# Patient Record
Sex: MALE | Race: WHITE | Employment: FULL TIME | URBAN - METROPOLITAN AREA
[De-identification: names, ages, dates, MRNs, and addresses within clinical notes are randomized per-mention and may not be internally consistent; named-entity substitution may affect disease eponyms.]

---

## 2021-04-15 ENCOUNTER — OFFICE VISIT (OUTPATIENT)
Dept: ORTHOPEDIC SURGERY | Age: 51
End: 2021-04-15
Payer: COMMERCIAL

## 2021-04-15 VITALS — WEIGHT: 230 LBS | HEIGHT: 73 IN | BODY MASS INDEX: 30.48 KG/M2

## 2021-04-15 DIAGNOSIS — M51.36 DDD (DEGENERATIVE DISC DISEASE), LUMBAR: ICD-10-CM

## 2021-04-15 DIAGNOSIS — R20.2 PARESTHESIA OF RIGHT LOWER EXTREMITY: ICD-10-CM

## 2021-04-15 DIAGNOSIS — M51.27 HERNIATION OF INTERVERTEBRAL DISC BETWEEN L5 AND S1: Primary | ICD-10-CM

## 2021-04-15 PROBLEM — M54.16 RIGHT LUMBAR RADICULITIS: Status: ACTIVE | Noted: 2021-04-15

## 2021-04-15 PROCEDURE — 96372 THER/PROPH/DIAG INJ SC/IM: CPT | Performed by: INTERNAL MEDICINE

## 2021-04-15 PROCEDURE — 99204 OFFICE O/P NEW MOD 45 MIN: CPT | Performed by: INTERNAL MEDICINE

## 2021-04-15 RX ORDER — CELECOXIB 200 MG/1
200 CAPSULE ORAL DAILY
Qty: 60 CAPSULE | Refills: 3 | Status: SHIPPED | OUTPATIENT
Start: 2021-04-15 | End: 2021-09-02 | Stop reason: SDUPTHER

## 2021-04-15 NOTE — PROGRESS NOTES
4/15/2021, 3:50 pm    Ketorolac Tromethamine Injection,   60 mg/2mL    NDC#: 63486-375-61    Lot#: [id number]    R dorsogluteal region

## 2021-04-15 NOTE — PROGRESS NOTES
Chief Complaint:   Chief Complaint   Patient presents with    Lower Back Pain     h/o back pain past 30 yrs, went to reach for and pour gas into mower and back pain, unable to move off couch, ED visit, then progressed to R hip/groin/thigh          History of Present Illness:       Patient is a 53 y.o. male presents with the above complaint. The symptoms began 10 daysago and started without an injury but was recently aggravated by forward bending tending to his lawnmower. His pain intensified thereafter prompting evaluation in the emergency room setting MRI was performed and findings demonstrated leftward pathology that was inconsistent with his right-sided limb symptoms. Treatment thus far has included pain management specialty consultation and trigger point injections along with chiropractic care and medical pain management inclusive of steroids both oral and IM and narcotic analgesics and escalating doses of Neurontin. Despite this his symptoms remain problematic and current pain levels 6/10 severity    He is on FMLA related to the current medical condition    The patient describes a strong neuritic quality of pain pain that does radiate involves the right lower limb in a nondermatomal distribution. The symptoms are constant  and are show no change since the onset. The pain is also prominent in the inner thigh and groin region. Past history significant intermittent low back pain and questionable history of prior lumbar spine intervention injections. The symptoms of back pain do notshow a typical discogenic provacative pattern and are constant   and improved with Changing position, chiropractic treatment and medication. . There is not new onset weakness or progressive weakness of the lower extremities that has developed. The patient denies new onset bowel or bladder dysfunction. There  is no history of previous spinal trauma.     Pain localizes to the lumbar region-axial lumbosacral    Painl Not on file   Social History Narrative    Not on file        Review of Symptoms:    Pertinent items are noted in HPI    Review of systems reviewed from Patient History Form dated on today's date and   available in the patient's chart under the Media tab. Vital Signs: There were no vitals filed for this visit. General Exam:     Constitutional: Patient is adequately groomed with no evidence of malnutrition  Mental Status: The patient is oriented to time, place and person. The patient's mood and affect are appropriate. Vascular: Examination reveals no swelling or calf tenderness. Peripheral pulses are palpable and 2+. Lymphatics: no lymphadenopathy of the inguinal region or lower extremity      Physical Exam: lower back      Primary Exam:    Inspection: No deformity atrophy appreciable curvature      Palpation: No focal trigger point tenderness      Range of Motion: 60/0      Strength: Normal lower extremity      Special Tests: Negative SLR      Skin: There are no rashes, ulcerations or lesions. Gait: Mild antalgic      Reflex intact lower     Additional Comments:        Additional Examinations:           Right Lower Extremity: Examination of the right lower extremity does not show any tenderness, deformity or injury. Range of motion is unremarkable. There is no gross instability. There are no rashes, ulcerations or lesions. Strength and tone are normal.  Left Lower Extremity: Examination of the left lower extremity does not show any tenderness, deformity or injury. Range of motion is unremarkable. There is no gross instability. There are no rashes, ulcerations or lesions. Strength and tone are normal.  Neurolgic - Hyperesthesia to light touch over the anterior aspect of the thigh, light touch is intact L2-S1 and manual muscle testing normal L2-S1. No fasiculations. Pattella tendon and Achilles tendon reflexes +2 bilaterally.   Seated SLR negative        Office Imaging Results/Procedures PerformedToday:            Office Procedures:     Orders Placed This Encounter   Procedures    MRI THORACIC SPINE WO CONTRAST     [facility], OPEN MRI, please call pt to schedule, [phone]  P[facility] will obtain auth and forward to your facility  Pt will need to f/u in clinic 2-3 days after MRI for results     Standing Status:   Future     Standing Expiration Date:   4/15/2022     Scheduling Instructions:      OPEN MRI     Order Specific Question:   Reason for exam:     Answer:   h/o R leg pain, r/o spinal cord lesion    [place] KETOROLAC TROMETHAMINE INJ       Intramuscular injection Toradol 2 cc right buttock alcohol prep 25-gauge needle        Other Outside Imaging and Testing Personally Reviewed:    MRI LUMBAR SPINE WO CON4/6/2021  [facility]   Result Impression       At L5-S1 there is a left paracentral protrusion deforming the dural sac and left lateral recess.  This measures approximately 4 mm. At L3-4, there is a left foraminal and extraforaminal protrusion that narrows the left neural foramen. Result Narrative   HISTORY:  Back pain, cauda equina syndrome suspected  Acute lower back pain.    COMPARISON: None  TECHNIQUE:   Multiplanar multisequence MRI images of the lumbar spine  NOTE:  If there are questions about the content of this report, please contact [address] [place] [address] radiology by calling [phone]    FINDINGS:  ALIGNMENT: No abnormal curvature  BONE MARROW: Unremarkable.  No aggressive osseous lesion or fracture  CONUS:  Unremarkable    DISC LEVELS:  T12-L1:  Unremarkable     L1-2:      Unremarkable    L2-3:      Unremarkable      L3-4:      There is a left foraminal and extraforaminal protrusion with annular fissure that narrows the left neural foramen around the left L3 root.    L4-5:      Minimal disc bulge and facet arthropathy.     L5-S1:    There is a left paracentral protrusion with some hyperintensity on the long TR sequences indenting the dural sac and displacing the left S1 root sleeve posteriorly in the lateral recess.  This is approximately 4 mm in AP dimensions.        LUMBOSACRAL JUNCTION: Unremarkable   SACRUM AND SI JOINTS:  Unremarkable  OTHER:  None   Other Result Information   Interface, Rad Results In - 04/06/2021 12:10 PM EDT  Formatting of this note might be different from the original.  HISTORY:  Back pain, cauda equina syndrome suspected  Acute lower back pain. COMPARISON: None  TECHNIQUE:   Multiplanar multisequence MRI images of the lumbar spine  NOTE:  If there are questions about the content of this report, please contact [facility] radiology by calling [phone]    FINDINGS:  ALIGNMENT: No abnormal curvature  BONE MARROW: Unremarkable. No aggressive osseous lesion or fracture  CONUS:  Unremarkable    DISC LEVELS:  T12-L1:  Unremarkable     L1-2:      Unremarkable    L2-3:      Unremarkable      L3-4:      There is a left foraminal and extraforaminal protrusion with annular fissure that narrows the left neural foramen around the left L3 root. L4-5:      Minimal disc bulge and facet arthropathy. L5-S1:    There is a left paracentral protrusion with some hyperintensity on the long TR sequences indenting the dural sac and displacing the left S1 root sleeve posteriorly in the lateral recess. This is approximately 4 mm in AP dimensions. LUMBOSACRAL JUNCTION: Unremarkable   SACRUM AND SI JOINTS:  Unremarkable  OTHER:  None    IMPRESSION      At L5-S1 there is a left paracentral protrusion deforming the dural sac and left lateral recess. This measures approximately 4 mm. At L3-4, there is a left foraminal and extraforaminal protrusion that narrows the left neural foramen. Status               Assessment   Impression: . Encounter Diagnoses   Name Primary?     Herniation of intervertebral disc between L5 and S1 Yes    DDD (degenerative disc disease), lumbar     Paresthesia of right lower extremity         Rule out thoracic spinal cord lesion, rule out lumbar plexopathy      Plan: Active modification lumbar disc protocol  Confirm and review MRI findings are leftward given the patient's presentation of rightward symptomatology  Consider MRI evaluation thoracic spine if findings are in fact accurate with respect to the left sided findings of disc pathology  Monitor his response to Toradol IM and start Celebrex 200 mg daily tomorrow with GI precaution continue PPI  Hold lumbar epidural invention until results of MRI T-spine are reviewed  Continue multimodal pain management inclusive of Neurontin and narcotic analgesia minimize use of narcotics  Consider EMG evaluation evaluate for lumbar plexopathy    The nature of the finding, probable diagnosis and likely treatment was thoroughly discussed with the patient and spouse. The options, risks, complications, alternative treatment as well as some of the differential diagnosis was discussed. The patient was thoroughly informed and all questions were answered. the patient indicated understanding and satisfaction with the discussion. Orders:        Orders Placed This Encounter   Procedures    MRI THORACIC SPINE WO CONTRAST     [facility], OPEN MRI, please call pt to schedule, [phone]  PON[facility] will obtain auth and forward to your facility  Pt will need to f/u in clinic 2-3 days after MRI for results     Standing Status:   Future     Standing Expiration Date:   4/15/2022     Scheduling Instructions:      OPEN MRI     Order Specific Question:   Reason for exam:     Answer:   h/o R leg pain, r/o spinal cord lesion    [place] KETOROLAC TROMETHAMINE INJ           Disclaimer: \"This note was dictated with voice recognition software. Though review and correction are routine, we apologize for any errors. \"

## 2021-04-16 ENCOUNTER — TELEPHONE (OUTPATIENT)
Dept: ORTHOPEDIC SURGERY | Age: 51
End: 2021-04-16

## 2021-04-22 ENCOUNTER — TELEPHONE (OUTPATIENT)
Dept: ORTHOPEDIC SURGERY | Age: 51
End: 2021-04-22

## 2021-04-22 DIAGNOSIS — M51.27 HERNIATION OF INTERVERTEBRAL DISC BETWEEN L5 AND S1: ICD-10-CM

## 2021-04-22 DIAGNOSIS — R20.2 PARESTHESIA OF RIGHT LOWER EXTREMITY: Primary | ICD-10-CM

## 2021-04-22 DIAGNOSIS — M51.36 DDD (DEGENERATIVE DISC DISEASE), LUMBAR: ICD-10-CM

## 2021-04-22 NOTE — TELEPHONE ENCOUNTER
Spoke to patient. He went to have MRI, did not know what the next step was, was not told to schedule F/U for results. He is upset, in a lot of pain. Wants to get epidural ASAP. Would like for you to call him. He is scheduled to come in on Monday but does not want to prolong the epidural getting done. [phone] Please advise.

## 2021-04-22 NOTE — TELEPHONE ENCOUNTER
General Question     Subject: PATIENT IN A LOT OF PAIN  Patient and /or Facility Request: Yusra Race  Contact Number: [phone]    WIFE CALLED AND STATED THAT NO ONE HAS RETURNED HER PHONE CALLS. HER  IS ON DAY 20 WITH THIS BACK PAIN.  HE WOULD LIKE A CALL BACK ASAP

## 2021-04-23 NOTE — TELEPHONE ENCOUNTER
Spoke to patient re: MRI  Needs to have EMG asap as ordered   Please correspond with him if he's needs to call or is waiting for a call to complete the EMG  thanks

## 2021-04-26 ENCOUNTER — OFFICE VISIT (OUTPATIENT)
Dept: ORTHOPEDIC SURGERY | Age: 51
End: 2021-04-26
Payer: COMMERCIAL

## 2021-04-26 VITALS — HEIGHT: 73 IN | BODY MASS INDEX: 30.47 KG/M2 | WEIGHT: 229.94 LBS

## 2021-04-26 DIAGNOSIS — R20.2 PARESTHESIA OF RIGHT LOWER EXTREMITY: ICD-10-CM

## 2021-04-26 DIAGNOSIS — M53.3 SACROILIAC JOINT DYSFUNCTION: Primary | ICD-10-CM

## 2021-04-26 DIAGNOSIS — M53.3 PAIN OF RIGHT SACROILIAC JOINT: ICD-10-CM

## 2021-04-26 PROCEDURE — 99214 OFFICE O/P EST MOD 30 MIN: CPT | Performed by: INTERNAL MEDICINE

## 2021-04-26 RX ORDER — BUPIVACAINE HYDROCHLORIDE 2.5 MG/ML
12 INJECTION, SOLUTION INFILTRATION; PERINEURAL ONCE
Status: COMPLETED | OUTPATIENT
Start: 2021-04-26 | End: 2021-04-26

## 2021-04-26 RX ORDER — LIDOCAINE HYDROCHLORIDE 10 MG/ML
5 INJECTION, SOLUTION INFILTRATION; PERINEURAL ONCE
Status: COMPLETED | OUTPATIENT
Start: 2021-04-26 | End: 2021-04-26

## 2021-04-26 RX ORDER — GABAPENTIN 300 MG/1
300 CAPSULE ORAL 2 TIMES DAILY
Qty: 60 CAPSULE | Refills: 1 | Status: SHIPPED | OUTPATIENT
Start: 2021-04-26 | End: 2021-05-05

## 2021-04-26 RX ADMIN — LIDOCAINE HYDROCHLORIDE 5 ML: 10 INJECTION, SOLUTION INFILTRATION; PERINEURAL at 11:47

## 2021-04-26 RX ADMIN — BUPIVACAINE HYDROCHLORIDE 30 MG: 2.5 INJECTION, SOLUTION INFILTRATION; PERINEURAL at 11:46

## 2021-04-26 NOTE — PROGRESS NOTES
Chief Complaint:   Chief Complaint   Patient presents with    Lower Back Pain     right sided, LBP and leg pain/N/T down to knee, \"piercing pain\" in groin, TR MRI          History of Present Illness:       Patient is a 53 y.o. male returns follow up for the above complaint. The patient was last seen approximately 11 daysago. The symptoms show no change since the last visit. The patient has had further testing for the problem. In the interim MRI scan thoracic spine was completed which is outlined below in detail    Overall show no appreciable change. 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 He continues to experience pain lumbosacral region on the right stabbing in quality along with neuritic character of pain involving the right lower limb primarily anterior medial but also involving the posterior aspect of the thigh and medial foreleg and does not follow a typical dermatomal pattern    Pain levels 4/10 severity he continues on multimodal pain management Celebrex, Neurontin and intermittent use of Percocet and muscle relaxants despite the symptoms remain problematic he remains off work      He denies any new onset progressive weakness of the the lower extremities he denies any new onset bowel or bladder dysfunction    He would like to consider other treatment options    EMG of the right lower extremity is scheduled for early May with neurology   Past Medical History:      No past medical history on file. Present Medications:         Current Outpatient Medications   Medication Sig Dispense Refill    celecoxib (CELEBREX) 200 MG capsule Take 1 capsule by mouth daily 60 capsule 3     No current facility-administered medications for this visit. Allergies:      No Known Allergies        Review of Systems:    Pertinent items are noted in HPI        Vital Signs:     There were no vitals filed for this visit. General Exam:     Constitutional: Patient is adequately groomed with no evidence of malnutrition    Physical Exam: Lumbar spine/pelvis      Primary Exam:    Inspection: No deformity atrophy appreciable curvature      Palpation: There is asymmetric tenderness over the right SI joint      Range of Motion: 70/10 pain with extension      Strength: Normal lower extremity      Special Tests: Supine sit test positive-right      Skin: There are no rashes, ulcerations or lesions.       Gait: Nonantalgic      Neurovascular - non focal and intact       Additional Comments:        Additional Examinations:                   Office Imaging Results/Procedures PerformedToday:             Office Procedures:    Ultrasound-guided RT sacroiliac joint injection-Marcaine block  [email or web address] E ultrasound 10 MHz     The patient was positioned prone on examination table with the abdomen supported with a pillow.  Diagnostic ultrasound was performed to identify the inferior aspect of the sacroiliac joint.  The l curvilinear probe was utilized.     After identifying the proper anatomic location a sterile prep was performed.  Using longitudinal technique and medial to lateral approach 25-gauge needle was advanced subcutaneously and intramuscularly under direct guidance and approximately 5 cc of 1% lidocaine was injected.  Needle was withdrawn a 22-gauge spinal needle was then advanced in the same needle tract and the needle  was advanced into the sacroiliac joint adjusting the position of the needle tip as needed to ensure intra-articular entry.  Loss of resistance was appreciated and the injection was completed with 3cc of 0.25% Marcaine.  Patient tolerated this with minimal discomfort     Band-Aid to seal the puncture wound     Partial positive anesthetic response postinjection     Technically successful injection       Orders Placed This Encounter   Procedures    Ambulatory referral to Physical Therapy     Referral Diagnoses   Name Primary?  Sacroiliac joint dysfunction Yes    Pain of right sacroiliac joint     Paresthesia of right lower extremity               Plan: Active modification postinjection protocol  Continue medical pain management as per previous wean off gabapentin  Consider advanced imaging of the sacroiliac joint. -CT pelvis rule out high-grade degenerative change/erosive changes  Pelvic stabilization program-PT supervision/chiropractic care supervision  Repeat injection with steroid. Orders:        Orders Placed This Encounter   Procedures    Ambulatory referral to Physical Therapy     Referral Priority:   Routine     Referral Type:   Eval and Treat     Referral Reason:   Specialty Services Required     Number of Visits Requested:   1         Kaela Hammond MD.      Shannen Stagers: \"This note was dictated with voice recognition software. Though review and correction are routine, we apologize for any errors. \"

## 2021-04-27 ENCOUNTER — HOSPITAL ENCOUNTER (OUTPATIENT)
Dept: PHYSICAL THERAPY | Age: 51
Setting detail: THERAPIES SERIES
Discharge: HOME | End: 2021-04-27
Payer: COMMERCIAL

## 2021-04-27 PROCEDURE — 97161 PT EVAL LOW COMPLEX 20 MIN: CPT | Performed by: PHYSICAL THERAPIST

## 2021-04-27 PROCEDURE — 97112 NEUROMUSCULAR REEDUCATION: CPT | Performed by: PHYSICAL THERAPIST

## 2021-04-27 PROCEDURE — 97110 THERAPEUTIC EXERCISES: CPT | Performed by: PHYSICAL THERAPIST

## 2021-04-27 NOTE — PLAN OF CARE
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 [employer]     Living Status/Prior Level of Function: Independent with ADLs and IADLs, gym program yard work,      OBJECTIVE:   ROM   Comments   Trunk flexion Mid shin back tight    Trunk extension ~20%     Trunk R sidebend Below lateral joint line    Trunk L sidebend Below lateral joint line pain R hip    Trunk R rotation     Trunk L rotation     HS flexibility                        Strength Left Right Comments   Hip flexion(L2) 4 4    Knee extension(L3) 5 5    Knee flexion(S1-2) 4 4    Ankle dorsiflexion(L4) 5 5    Toe extension(L5) 5 5    Ankle eversion/plantar flexion(S1) 5 5    Hip abd   4  4        Special tests   Comments   SLR      Slump test Tight and tingling R     Pelvic symmetry       Segmental Spinal mobility      Heel walk neg     Toe walk neg     Tandem walk                 DTRs Left Right Comments   Patellar(L3-L4) 2 2     Achilles(S1-S2) 2 2                  Joint mobility:               []Normal               [x]Hypo              []Hyper     Palpation: TTP R SI and hip area more then L, moderate tightness of soft tissue      Functional Mobility/Transfers: modified I with pain     Posture: decreased lordosis, protective posturing      Bandages/Dressings/Incisions:      Gait: (include devices/WB status) protective posturing noted                           [x] Patient history, allergies, meds reviewed. Medical chart reviewed. See intake form.       Review Of Systems (ROS):  [x]Performed Review of systems (Integumentary, CardioPulmonary, Neurological) by intake and observation. Intake form has been scanned into medical record. Patient has been instructed to contact their primary care physician regarding ROS issues if not already being addressed at this time. Co-morbidities/Complexities (which will affect course of rehabilitation):   [x]None              Arthritic conditions   []Rheumatoid arthritis (M05.9)  []Osteoarthritis (M19.91)    Cardiovascular conditions   []Hypertension (I10)  []Hyperlipidemia (E78.5)  []Angina pectoris (I20)  []Atherosclerosis (I70)    Musculoskeletal conditions   []Disc pathology   []Congenital spine pathologies   []Prior surgical intervention  []Osteoporosis (M81.8)  []Osteopenia (M85.8)   Endocrine conditions   []Hypothyroid (E03.9)  []Hyperthyroid Gastrointestinal conditions   []Constipation (Y00.90)    Metabolic conditions   []Morbid obesity (E66.01)  []Diabetes type 1(E10.65) or 2 (E11.65)   []Neuropathy (G60.9)      Pulmonary conditions   []Asthma (J45)  []Coughing   []COPD (J44.9)    Psychological Disorders  []Anxiety (F41.9)  []Depression (F32.9)   []Other:    []Other:            Barriers to/and or personal factors that will affect rehab potential:              [x]Age  [x]Sex              [x]Motivation/Lack of Motivation                        []Co-Morbidities              []Cognitive Function, education/learning barriers              []Environmental, home barriers              [x]profession/work barriers  []past PT/medical experience  []other:       Falls Risk Assessment (30 days):   [x] Falls Risk assessed and no intervention required.   [] Falls Risk assessed and Patient requires intervention due to being higher risk   TUG score (>12s at risk):     [] Falls education provided, including        ASSESSMENT:   Functional Impairments:                [x]Noted lumbar/proximal hip hypomobility              []Noted lumbosacral and/or generalized hypermobility              []Decreased Lumbosacral/hip/LE functional ROM              [x]Decreased core/proximal hip strength and neuromuscular control               []Decreased LE functional strength               []Abnormal reflexes/sensation/myotomal/dermatomal deficits  []Reduced balance/proprioceptive control               []other:       Functional Activity Limitations (from functional questionnaire and intake)              [x]Reduced ability to tolerate prolonged functional positions              [x]Reduced ability or difficulty with changes of positions or transfers between positions              [x]Reduced ability to maintain good posture and demonstrate good body mechanics with sitting, bending, and lifting              [x]Reduced ability to sleep              [x] Reduced ability or tolerance with driving and/or computer work              [x]Reduced ability to perform lifting, reaching, carrying tasks              [x]Reduced ability to squat              [x]Reduced ability to forward bend              [x]Reduced ability to ambulate prolonged functional periods/distances/surfaces              [x]Reduced ability to ascend/descend stairs              []other:       Participation Restrictions              [x]Reduced participation in self care activities              [x]Reduced participation in home management activities              [x]Reduced participation in work activities              [x]Reduced participation in social activities. [x]Reduced participation in sport/recreational activities. Classification:              []Signs/symptoms consistent with Lumbar instability/stabilization subgroup. []Signs/symptoms consistent with Lumbar mobilization/manipulation subgroup, myotomes and dermatomes intact. Meets manipulation criteria.                []Signs/symptoms consistent with Lumbar direction specific/centralization subgroup              []Signs/symptoms consistent with Lumbar traction subgroup                            [x]Signs/symptoms consistent with lumbar facet dysfunction, SI dysfuction              []Signs/symptoms consistent with lumbar stenosis type dysfunction              []Signs/symptoms consistent with nerve root involvement including myotome & dermatome dysfunction              []Signs/symptoms consistent with post-surgical status including: decreased ROM, strength and function. []signs/symptoms consistent with pathology which may benefit from Dry needling                []other:       Prognosis/Rehab Potential:                                       []Excellent              [x]Good                 []Fair              []Poor     Tolerance of evaluation/treatment:               []Excellent              []Good                 [x]Fair              []Poor     Physical Therapy Evaluation Complexity Justification  [x] A history of present problem with:  [] no personal factors and/or comorbidities that impact the plan of care;  [x]1-2 personal factors and/or comorbidities that impact the plan of care  []3 personal factors and/or comorbidities that impact the plan of care  [x] An examination of body systems using standardized tests and measures addressing any of the following: body structures and functions (impairments), activity limitations, and/or participation restrictions;:  [] a total of 1-2 or more elements   [x] a total of 3 or more elements   [] a total of 4 or more elements   [x] A clinical presentation with:  [x] stable and/or uncomplicated characteristics   [] evolving clinical presentation with changing characteristics  [] unstable and unpredictable characteristics;   [x] Clinical decision making of [x] low, [] moderate, [] high complexity using standardized patient assessment instrument and/or measurable assessment of functional outcome.      [x] EVAL (LOW) 28282 (typically 20 minutes face-to-face)  [] EVAL (MOD) 09766 (typically 30 minutes face-to-face)  [] EVAL (HIGH) 16799 (typically 45 minutes face-to-face)  [] RE-EVAL            PLAN:      Frequency/Duration:  1-2 days per week for 12 Weeks:  Interventions:  [x]  Therapeutic exercise including: strength training, ROM, for LE, Glutes and core   [x]  NMR activation and proprioception for glutes , LE and Core   [x]  Manual therapy as indicated for Hip complex, LE and spine to include: Dry Needling/IASTM, STM, PROM, Gr I-IV mobilizations, manipulation. [x]  Modalities as needed that may include: thermal agents, E-stim, Biofeedback, US, iontophoresis as indicated  [x]  Patient education on joint protection, postural re-education, activity modification, progression of HEP. HEP instruction:    Access Code: [email or web address]: [email or web address]: 04/27/2021Prepared by: Tequila Melton Single Knee to Chest Stretch - 2-3 x daily - 7 x weekly - 10 reps - 10 hold   Supine Double Knee to Chest - 2-3 x daily - 7 x weekly - 10 reps - 10 hold   Supine Hamstring Stretch - 2-3 x daily - 7 x weekly - 10 reps - 10 hold   Cat-Camel - 2-3 x daily - 7 x weekly - 10 reps - 10 hold   Beginner Front Arm Support - 2-3 x daily - 7 x weekly - 2 sets - 10 reps   Prone Gluteal Sets - 2-3 x daily - 7 x weekly - 10 reps - 10 hold   Beginner Prone Single Leg Raise - 2-3 x daily - 7 x weekly - 10 reps - 10 hold   Prone Knee Flexion AAROM with Overpressure - 2-3 x daily - 7 x weekly - 10 reps - 10 hold   Kneeling Hip Flexor Stretch - 2-3 x daily - 7 x weekly - 10 reps - 10 hold    GOALS:   Patient stated goal: RTW, gym program and heavy adls including yard work without pain. [] Progressing: [] Met: [] Not Met: [] Adjusted    Therapist goals for Patient:   Short Term Goals: To be achieved in: 2-4 weeks  1. Independent in HEP and progression per patient tolerance, in order to prevent re-injury. [] Progressing: [] Met: [] Not Met: [] Adjusted   2.  Patient will have a decrease in pain to facilitate improvement in movement, function, and ADLs as indicated by Functional Deficits. [] Progressing: [] Met: [] Not Met: [] Adjusted    Long Term Goals: To be achieved in: 12 weeks  1. Disability index score of 15% or less for the Ramonaan to assist with reaching prior level of function. [] Progressing: [] Met: [] Not Met: [] Adjusted  2. Patient will demonstrate increased AROM to WNL, good LS mobility, good hip ROM to allow for proper joint functioning as indicated by patients Functional Deficits. [] Progressing: [] Met: [] Not Met: [] Adjusted  3. Patient will demonstrate an increase in Strength to good proximal hip and core activation to allow for proper functional mobility as indicated by patients Functional Deficits. [] Progressing: [] Met: [] Not Met: [] Adjusted  4. Patient will return to  functional activities  without increased symptoms or restriction.  Walking steps sleeping transitional movements   [] Progressing: [] Met: [] Not Met: [] Adjusted        Electronically signed by: Erica Little PT

## 2021-04-27 NOTE — FLOWSHEET NOTE
[facility], [id number] [address] [place] [address], [address] [place] [address]  Phone: [phone]   Fax:     [phone]    Physical Therapy Daily Treatment Note  Date:  2021    Patient Name:  So Light    :  1970  MRN: [id number]  Restrictions/Precautions:    Medical/Treatment Diagnosis Information:  Diagnosis: M53.3 (ICD-10-CM) - Sacroiliac joint dysfunction  Treatment Diagnosis: LBP with radicular symptoms  Insurance/Certification information:  PT Insurance Information: BCBS no auth  Physician Information:  Referring Practitioner: Prince Luna MD  Has the plan of care been signed (Y/N):        []  Yes  [x]  No     Date of Patient follow up with Physician:5 visits if not 50% better      Is this a Progress Report:     []  Yes  [x]  No        If Yes:  Date Range for reporting period:  Beginning  Ending    Progress report will be due (10 Rx or 30 days whichever is less):       Recertification will be due (POC Duration  / 90 days whichever is less):         Visit # Insurance Allowable Auth Required   1    Spine referral refer to MD in 5 visits if no improvement  60 visits []  Yes [x]  No        Functional Scale: [place] 49% deficit   Date assessed:       Latex Allergy:  [x]NO      []YES  Preferred Language for Healthcare:   [x]English       []other:      Pain level:  2-7/10     SUBJECTIVE:   See eval    OBJECTIVE: See eval        RESTRICTIONS/PRECAUTIONS: none    Exercises/Interventions:   ROM/stretches     SKTC [id number]    DKTC [id number]    Prayer stretch/dav pose [id number]    Supine HS [id number]    Pelvic tilt     Kneeling ant hip stretch [id number]    Cat and camel [id number]    Prone quad stretch push with opposite leg [id number]    Strengthening     SLR     Quadruped alternate UE reaches     Quadruped alternate LE reaches [id number]    Quadruped alternate UE/LE reaches     prone glut sets            \" With hip ext [id number]  [id number]                        Supine 90/90 10 min        Manual Intervention             Prone PA      GISTM/STM      Lumbar Manip      SI Manip      Hip belt mobs      Hip LA distraction              Therapeutic Exercise and NMR EXR  [x] (77234) Provided verbal/tactile cueing for activities related to strengthening, flexibility, endurance, ROM  for improvements in proximal hip and core control with self care, mobility, lifting and ambulation. [x] (80149) Provided verbal/tactile cueing for activities related to improving balance, coordination, kinesthetic sense, posture, motor skill, proprioception  to assist with core control in self care, mobility, lifting, and ambulation.      Therapeutic Activities:    [] (55405 or 67633) Provided verbal/tactile cueing for activities related to improving balance, coordination, kinesthetic sense, posture, motor skill, proprioception and motor activation to allow for proper function  with self care and ADLs  [] (44791) Provided training and instruction to the patient for proper core and proximal hip recruitment and positioning with ambulation re-education     Home Exercise Program:    [x] (38668) Reviewed/Progressed HEP activities related to strengthening, flexibility, endurance, ROM of core, proximal hip and LE for functional self-care, mobility, lifting and ambulation   [] (75729) Reviewed/Progressed HEP activities related to improving balance, coordination, kinesthetic sense, posture, motor skill, proprioception of core, proximal hip and LE for self care, mobility, lifting, and ambulation      Manual Treatments:  PROM / STM / Oscillations-Mobs:  G-I, II, III, IV (PA's, Inf., Post.)  [] (65486) Provided manual therapy to mobilize proximal hip and LS spine soft tissue/joints for the purpose of modulating pain, promoting relaxation,  increasing ROM, reducing/eliminating soft tissue swelling/inflammation/restriction, improving soft tissue extensibility and allowing for proper ROM for normal function with self care, mobility, lifting and ambulation. Modalities:       Charges:  Timed Code Treatment Minutes: 30   Total Treatment Minutes: 60       [x] EVAL (LOW) 01197 (typically 20 minutes face-to-face)  [] EVAL (MOD) 42016 (typically 30 minutes face-to-face)  [] EVAL (HIGH) 34278 (typically 45 minutes face-to-face)  [] RE-EVAL     [x] KK(44441) x  1   [] IONTO  [x] NMR (85687) x  1   [] VASO  [] Manual (37578) x      [] Other:  [] TA x      [] Mech Traction (07097)  [] ES(attended) (76929)      [] ES (un) (51632):     Goals:   Patient stated goal: RTW, gym program and heavy adls including yard work without pain. []? Progressing: []? Met: []? Not Met: []? Adjusted     Therapist goals for Patient:   Short Term Goals: To be achieved in: 2-4 weeks  1. Independent in HEP and progression per patient tolerance, in order to prevent re-injury. []? Progressing: []? Met: []? Not Met: []? Adjusted   2. Patient will have a decrease in pain to facilitate improvement in movement, function, and ADLs as indicated by Functional Deficits. []? Progressing: []? Met: []? Not Met: []? Adjusted     Long Term Goals: To be achieved in: 12 weeks  1. Disability index score of 15% or less for the Ramonaan to assist with reaching prior level of function. []? Progressing: []? Met: []? Not Met: []? Adjusted  2. Patient will demonstrate increased AROM to WNL, good LS mobility, good hip ROM to allow for proper joint functioning as indicated by patients Functional Deficits.   []? Progressing: []? Met: []? Not Met: []? Adjusted  3. Patient will demonstrate an increase in Strength to good proximal hip and core activation to allow for proper functional mobility as indicated by patients Functional Deficits. []? Progressing: []? Met: []? Not Met: []? Adjusted  4. Patient will return to  functional activities  without increased symptoms or restriction.  Walking steps sleeping transitional movements   []? Progressing: []? Met: []? Not Met: []? Adjusted     Overall Progression Towards Functional goals/ Treatment Progress Update:  [] Patient is progressing as expected towards functional goals listed. [] Progression is slowed due to complexities/Impairments listed. [] Progression has been slowed due to co-morbidities. [x] Plan just implemented, too soon to assess goals progression <30days   [] Goals require adjustment due to lack of progress  [] Patient is not progressing as expected and requires additional follow up with physician  [] Other    Prognosis for POC: [x] Good [] Fair  [] Poor      Patient requires continued skilled intervention: [x] Yes  [] No    Treatment/Activity Tolerance:  [x] Patient able to complete treatment  [] Patient limited by fatigue  [x] Patient limited by pain     [] Patient limited by other medical complications  [] Other:     Patient education:Reviewed diagnosis, POC, HEP and its importance. HEP instruction:    Access Code: [email or web address]: [employer]/Date: 04/27/2021Prepared by: Maddy Sheridan   · Hooklying Single Knee to Chest Stretch - 2-3 x daily - 7 x weekly - 10 reps - 10 hold   · Supine Double Knee to Chest - 2-3 x daily - 7 x weekly - 10 reps - 10 hold   · Supine Hamstring Stretch - 2-3 x daily - 7 x weekly - 10 reps - 10 hold   · Cat-Camel - 2-3 x daily - 7 x weekly - 10 reps - 10 hold   · Beginner Front Arm Support - 2-3 x daily - 7 x weekly - 2 sets - 10 reps   · Prone Gluteal Sets - 2-3 x daily - 7 x weekly - 10 reps - 10 hold   · Beginner Prone Single Leg Raise - 2-3 x daily - 7 x weekly - 10 reps - 10 hold   · Prone Knee Flexion AAROM with Overpressure - 2-3 x daily - 7 x weekly - 10 reps - 10 hold   · Kneeling Hip Flexor Stretch - 2-3 x daily - 7 x weekly - 10 reps - 10 hold  Prognosis: [] Good [] Fair  [] Poor    Patient Requires Follow-up: [x] Yes  [] No    PLAN: See eval  [] Continue per plan of care [] Alter current plan (see comments)  [x] Plan of care initiated [] Hold pending MD visit [] Discharge    Electronically signed by: Génesis Burnette PT,     *If patient does not return for further follow ups after this date. Please consider this as the patients discharge from physical therapy.

## 2021-04-29 ENCOUNTER — APPOINTMENT (OUTPATIENT)
Dept: PHYSICAL THERAPY | Age: 51
End: 2021-04-29
Payer: COMMERCIAL

## 2021-04-30 ENCOUNTER — APPOINTMENT (OUTPATIENT)
Dept: PHYSICAL THERAPY | Age: 51
End: 2021-04-30
Payer: COMMERCIAL

## 2021-05-03 ENCOUNTER — HOSPITAL ENCOUNTER (OUTPATIENT)
Dept: PHYSICAL THERAPY | Age: 51
Setting detail: THERAPIES SERIES
Discharge: HOME | End: 2021-05-03
Payer: COMMERCIAL

## 2021-05-03 PROCEDURE — 97112 NEUROMUSCULAR REEDUCATION: CPT | Performed by: PHYSICAL THERAPIST

## 2021-05-03 PROCEDURE — 97110 THERAPEUTIC EXERCISES: CPT | Performed by: PHYSICAL THERAPIST

## 2021-05-03 NOTE — FLOWSHEET NOTE
[facility], [address] [employer] [address] [place] [address], [address] [place] [address]  Phone: [phone]   Fax:     [phone]    Physical Therapy Daily Treatment Note  Date:  5/3/2021    Patient Name:  Norris Honeycutt    :  1970  MRN: [id number]  Restrictions/Precautions:    Medical/Treatment Diagnosis Information:  Diagnosis: M53.3 (ICD-10-CM) - Sacroiliac joint dysfunction  Treatment Diagnosis: LBP with radicular symptoms  Insurance/Certification information:  PT Insurance Information: BCBS no auth  Physician Information:  Referring Practitioner: Deepali Mandujano MD  Has the plan of care been signed (Y/N):        []  Yes  [x]  No     Date of Patient follow up with Physician:5 visits if not 50% better      Is this a Progress Report:     []  Yes  [x]  No        If Yes:  Date Range for reporting period:  Beginning  Ending    Progress report will be due (10 Rx or 30 days whichever is less):       Recertification will be due (POC Duration  / 90 days whichever is less):         Visit # Insurance Allowable Auth Required   2    Spine referral refer to MD in 5 visits if no improvement  60 visits []  Yes [x]  No        Functional Scale: que[place] 49% deficit   Date assessed:       Latex Allergy:  [x]NO      []YES  Preferred Language for Healthcare:   [x]English       []other:      Pain level:  2-7/10      SUBJECTIVE:  5/3 [ain better but N/Y anterior hip and thigh mostly above knee. Having EMG tomorrow. Sleeping remains difficult. RTW wed. Job is mainly sitting. Also doing chiropractic but no adjustments .   Rollers, estim, distraction     OBJECTIVE: See eval        RESTRICTIONS/PRECAUTIONS: none    Exercises/Interventions:   ROM/stretches     SKTC [id number]    DKTC [id number]    Prayer stretch/dav pose 81m11tyd    Supine HS 61i97koi    Pelvic tilt     Kneeling ant hip stretch 72z52ekx    Cat and camel 91n11hcr    Prone quad stretch push with opposite leg 38o07fyq    Strengthening     SLR     Quadruped alternate UE reaches     Quadruped alternate LE/UE reaches 30i98ipv    Quadruped alternate UE/LE reaches     prone glut sets            \"             With hip ext/UE 10b10xal  63k52ajp    tspine open bood 5x5sec start5/3   tspine seated ext with pec stretch 5x5sec start5/3             Supine 90/90 10 min        Manual Intervention             Prone PA      GISTM/STM      Lumbar Manip      SI Manip      Hip belt mobs      Hip LA distraction R 32wnr8i  L 98noa6p 4 min 5/3             Therapeutic Exercise and NMR EXR  [x] (45269) Provided verbal/tactile cueing for activities related to strengthening, flexibility, endurance, ROM  for improvements in proximal hip and core control with self care, mobility, lifting and ambulation. [x] (27479) Provided verbal/tactile cueing for activities related to improving balance, coordination, kinesthetic sense, posture, motor skill, proprioception  to assist with core control in self care, mobility, lifting, and ambulation.      Therapeutic Activities:    [] (72493 or 50790) Provided verbal/tactile cueing for activities related to improving balance, coordination, kinesthetic sense, posture, motor skill, proprioception and motor activation to allow for proper function  with self care and ADLs  [] (79518) Provided training and instruction to the patient for proper core and proximal hip recruitment and positioning with ambulation re-education     Home Exercise Program:    [x] (00229) Reviewed/Progressed HEP activities related to strengthening, flexibility, endurance, ROM of core, proximal hip and LE for functional self-care, mobility, lifting and ambulation   [] (89734) Reviewed/Progressed HEP activities related to improving balance, coordination, kinesthetic sense, posture, motor skill, proprioception of core, proximal hip and LE for self care, mobility, lifting, and ambulation      Manual Treatments:  PROM / STM / Oscillations-Mobs:  G-I, II, III, IV (PA's, Inf., Post.)  [] (03449) Provided manual therapy to mobilize proximal hip and LS spine soft tissue/joints for the purpose of modulating pain, promoting relaxation,  increasing ROM, reducing/eliminating soft tissue swelling/inflammation/restriction, improving soft tissue extensibility and allowing for proper ROM for normal function with self care, mobility, lifting and ambulation. Modalities:       Charges:  Timed Code Treatment Minutes: 45   Total Treatment Minutes: 5533-7666       [] EVAL (LOW) 90561 (typically 20 minutes face-to-face)  [] EVAL (MOD) 40986 (typically 30 minutes face-to-face)  [] EVAL (HIGH) 84850 (typically 45 minutes face-to-face)  [] RE-EVAL     [x] XZ(57050) x  2   [] IONTO  [x] NMR (71297) x  1   [] VASO  [] Manual (53245) x      [] Other:  [] TA x      [] Mech Traction (41714)  [] ES(attended) (89360)      [] ES (un) (10066):     Goals:   Patient stated goal: RTW, gym program and heavy adls including yard work without pain. []? Progressing: []? Met: []? Not Met: []? Adjusted     Therapist goals for Patient:   Short Term Goals: To be achieved in: 2-4 weeks  1. Independent in HEP and progression per patient tolerance, in order to prevent re-injury. []? Progressing: []? Met: []? Not Met: []? Adjusted   2. Patient will have a decrease in pain to facilitate improvement in movement, function, and ADLs as indicated by Functional Deficits. []? Progressing: []? Met: []? Not Met: []? Adjusted     Long Term Goals: To be achieved in: 12 weeks  1. Disability index score of 15% or less for the [place] to assist with reaching prior level of function. []? Progressing: []? Met: []? Not Met: []? Adjusted  2. Patient will demonstrate increased AROM to WNL, good LS mobility, good hip ROM to allow for proper joint functioning as indicated by patients Functional Deficits.   []? Progressing: []? Met: []? Not Met: []? Adjusted  3.  Patient will demonstrate an increase in Strength to good proximal hip and core activation to allow for proper functional mobility as indicated by patients Functional Deficits. []? Progressing: []? Met: []? Not Met: []? Adjusted  4. Patient will return to  functional activities  without increased symptoms or restriction. Walking steps sleeping transitional movements   []? Progressing: []? Met: []? Not Met: []? Adjusted     Overall Progression Towards Functional goals/ Treatment Progress Update:  [] Patient is progressing as expected towards functional goals listed. [] Progression is slowed due to complexities/Impairments listed. [] Progression has been slowed due to co-morbidities. [x] Plan just implemented, too soon to assess goals progression <30days   [] Goals require adjustment due to lack of progress  [] Patient is not progressing as expected and requires additional follow up with physician  [] Other    Prognosis for POC: [x] Good [] Fair  [] Poor      Patient requires continued skilled intervention: [x] Yes  [] No    Treatment/Activity Tolerance:  [x] Patient able to complete treatment  [] Patient limited by fatigue  [] Patient limited by pain     [] Patient limited by other medical complications  [x] Other: subjective c/o of pain improved however N/T and difficulty sleeping persists. Pt may benefit from dry needling to hip/LB area in future if traditional treatment plateaus. area     Patient education:Reviewed diagnosis, POC, HEP and its importance. HEP instruction:    Access Code: [email or web address]: [email or web address]. com/Date: 04/27/2021Prepared by: Zuleika Brizuela   · Hooklying Single Knee to Chest Stretch - 2-3 x daily - 7 x weekly - 10 reps - 10 hold   · Supine Double Knee to Chest - 2-3 x daily - 7 x weekly - 10 reps - 10 hold   · Supine Hamstring Stretch - 2-3 x daily - 7 x weekly - 10 reps - 10 hold   · Cat-Camel - 2-3 x daily - 7 x weekly - 10 reps - 10 hold   · Beginner Front Arm Support - 2-3 x daily - 7 x weekly - 2 sets - 10 reps   · Prone Gluteal Sets - 2-3 x daily - 7 x weekly - 10 reps - 10 hold   · Beginner Prone Single Leg Raise - 2-3 x daily - 7 x weekly - 10 reps - 10 hold   · Prone Knee Flexion AAROM with Overpressure - 2-3 x daily - 7 x weekly - 10 reps - 10 hold   · Kneeling Hip Flexor Stretch - 2-3 x daily - 7 x weekly - 10 reps - 10 hold  Prognosis: [] Good [] Fair  [] Poor    Patient Requires Follow-up: [x] Yes  [] No     PLAN: See eval  [x] Continue per plan of care [] Alter current plan (see comments)  [] Plan of care initiated [] Hold pending MD visit [] Discharge    Electronically signed by: Oscar Schneider PT,     *If patient does not return for further follow ups after this date. Please consider this as the patients discharge from physical therapy.

## 2021-05-04 ENCOUNTER — PROCEDURE VISIT (OUTPATIENT)
Dept: NEUROLOGY | Age: 51
End: 2021-05-04
Payer: COMMERCIAL

## 2021-05-04 ENCOUNTER — TELEPHONE (OUTPATIENT)
Dept: ORTHOPEDIC SURGERY | Age: 51
End: 2021-05-04

## 2021-05-04 DIAGNOSIS — M54.16 LUMBAR RADICULOPATHY: Primary | ICD-10-CM

## 2021-05-04 PROCEDURE — 95886 MUSC TEST DONE W/N TEST COMP: CPT | Performed by: PSYCHIATRY & NEUROLOGY

## 2021-05-04 PROCEDURE — 95908 NRV CNDJ TST 3-4 STUDIES: CPT | Performed by: PSYCHIATRY & NEUROLOGY

## 2021-05-04 NOTE — TELEPHONE ENCOUNTER
Pt called to ask when we could put in order for epidural.  He has his EMG today at 2:30 pm.  I told him that the plan was to do another SIJ injection, this time with cortisone. Would take approx 24 hrs to get EMG TR, so offered pt an appt tomorrow. Confirmed w pt that he did get relief from marcaine block to SIJ, but that it was only temporary, lasting a couple of hrs. Pt states that his work accommodation paperwork states that he is to RTW tomorrow. Pt also still wanted to know if/when we would put in the epidural order. Extend off work restrictions? Schedule for SIJ CSI? Please advise.

## 2021-05-04 NOTE — PROGRESS NOTES
Zev Oh M.D. [facility]) Physicians/[facility]  Board Certified in [address] [facility] [address] [facility], [address]    EMG / NERVE CONDUCTION STUDY      PATIENT:  Trista Dawson       DATE OF EM21     YOB: 1970       REASON FOR EMG:   Low back pain and right leg pain      REFERRING PHYSICIAN:  Adarsh Abebe MD  [facility] [place] [address]  [facility],  [address] [place] [address]     SUMMARY:   The right peroneal and posterior tibial motor nerve studies were normal.  The right sural sensory nerve study was normal.  Needle EMG of several muscles in the right lower extremity showed evidence of mild denervation in the right vastus lateralis muscle and more significant denervation in the right lumbar paraspinal muscles. CLINICAL DIAGNOSIS:  Lumbar radiculopathy        EMG RESULTS:   This patient has electrophysiological evidence for right 3 L4 radiculopathy.        ---------------------------------------------  Zev Oh M.D.   Electromyographer / Neurologist

## 2021-05-05 ENCOUNTER — OFFICE VISIT (OUTPATIENT)
Dept: ORTHOPEDIC SURGERY | Age: 51
End: 2021-05-05
Payer: COMMERCIAL

## 2021-05-05 VITALS — HEIGHT: 73 IN | WEIGHT: 229.94 LBS | BODY MASS INDEX: 30.47 KG/M2

## 2021-05-05 DIAGNOSIS — M53.3 SACROILIAC JOINT DYSFUNCTION: ICD-10-CM

## 2021-05-05 DIAGNOSIS — M51.26 HERNIATION OF LUMBAR INTERVERTEBRAL DISC: ICD-10-CM

## 2021-05-05 DIAGNOSIS — R94.131 ABNORMAL EMG: ICD-10-CM

## 2021-05-05 DIAGNOSIS — M54.16 RIGHT LUMBAR RADICULOPATHY: ICD-10-CM

## 2021-05-05 PROCEDURE — 99214 OFFICE O/P EST MOD 30 MIN: CPT | Performed by: INTERNAL MEDICINE

## 2021-05-05 RX ORDER — TRAMADOL HYDROCHLORIDE 50 MG/1
50 TABLET ORAL
Qty: 30 TABLET | Refills: 0 | Status: SHIPPED | OUTPATIENT
Start: 2021-05-05 | End: 2021-05-12

## 2021-05-05 RX ORDER — GABAPENTIN 300 MG/1
CAPSULE ORAL
Qty: 90 CAPSULE | Refills: 1 | Status: SHIPPED | OUTPATIENT
Start: 2021-05-05 | End: 2021-07-08

## 2021-05-05 RX ORDER — ZOLPIDEM TARTRATE 10 MG/1
10 TABLET ORAL NIGHTLY PRN
Qty: 14 TABLET | Refills: 0 | Status: SHIPPED | OUTPATIENT
Start: 2021-05-05 | End: 2021-05-20 | Stop reason: SDUPTHER

## 2021-05-05 NOTE — PROGRESS NOTES
L2-3:      Unremarkable      L3-4:      There is a left foraminal and extraforaminal protrusion with annular fissure that narrows the left neural foramen around the left L3 root.    L4-5:      Minimal disc bulge and facet arthropathy.     L5-S1:    There is a left paracentral protrusion with some hyperintensity on the long TR sequences indenting the dural sac and displacing the left S1 root sleeve posteriorly in the lateral recess.  This is approximately 4 mm in AP dimensions.        LUMBOSACRAL JUNCTION: Unremarkable   SACRUM AND SI JOINTS:  Unremarkable  OTHER:  None   Other Result Information   Interface, Rad Results In - 04/06/2021 12:10 PM EDT  Formatting of this note might be different from the original.  HISTORY:  Back pain, cauda equina syndrome suspected  Acute lower back pain. COMPARISON: None  TECHNIQUE:   Multiplanar multisequence MRI images of the lumbar spine  NOTE:  If there are questions about the content of this report, please contact [address] [place] [address] by calling [phone]    FINDINGS:  ALIGNMENT: No abnormal curvature  BONE MARROW: Unremarkable. No aggressive osseous lesion or fracture  CONUS:  Unremarkable    DISC LEVELS:  T12-L1:  Unremarkable     L1-2:      Unremarkable    L2-3:      Unremarkable      L3-4:      There is a left foraminal and extraforaminal protrusion with annular fissure that narrows the left neural foramen around the left L3 root. L4-5:      Minimal disc bulge and facet arthropathy. L5-S1:    There is a left paracentral protrusion with some hyperintensity on the long TR sequences indenting the dural sac and displacing the left S1 root sleeve posteriorly in the lateral recess. This is approximately 4 mm in AP dimensions. LUMBOSACRAL JUNCTION: Unremarkable   SACRUM AND SI JOINTS:  Unremarkable  OTHER:  None    IMPRESSION      At L5-S1 there is a left paracentral protrusion deforming the dural sac and left lateral recess. This measures approximately 4 mm. At L3-4, there is a left foraminal and extraforaminal protrusion that narrows the left neural foramen. Status             Assessment   Impression: . Encounter Diagnoses   Name Primary?  Herniation of lumbar intervertebral disc     Right lumbar radiculopathy     Abnormal EMG     Sacroiliac joint dysfunction         L3/L4 leftward disc herniation      Plan:     Remain off work tentatively until 5/17/2021  Active modification lumbar disc protocol  Continue multimodal pain management titrate gabapentin to 900 mg daily, as needed use of Ultram minimize use of narcotics Ambien nightly for insomnia short-term  Proceed with lumbar epidural ASAP-L3/L4 transforaminal right  Continue pelvic position program and PT and add lumbar stabilization program      Despite the lack of MRI correlation at L3/L4 symptoms clinically correlate with the EMG findings proceed as outlined above. Overall main pain generator relates to his L3 radiculopathy secondary pain generator relates to the SI joint dysfunction. He is to continue with physical therapy we will hold any further intervention with respect intervention/injection of the right SI joint. The nature of the finding, probable diagnosis and likely treatment was thoroughly discussed with the patient. The options, risks, complications, alternative treatment as well as some of the differential diagnosis was discussed. The patient was thoroughly informed and all questions were answered. the patient indicated understanding and satisfaction with the discussion. Orders:        Orders Placed This Encounter   Procedures    Ambulatory epidural steroid injection     Right-L3/L4 transforaminal    Abnormal EMG right lower extremity, disc herniation at L3/L4 is leftward however     Standing Status:   Future     Standing Expiration Date:   5/5/2022     Scheduling Instructions:      Dr. Alex Beth:     \"This note was dictated with voice recognition software. Though review and correction are routine, we apologize for any errors. \"

## 2021-05-05 NOTE — LETTER
[address]  [address] [facility] [address]  Phone: [phone]  Fax: [phone]    Sanya Devine MD        May 5, 2021     Patient: Verónica Laguna   YOB: 1970   Date of Visit: 5/5/2021       To Whom It May Concern: It is my medical opinion that [address] should remain out of work until 5/17/2021. Coordination of medical care and scheduling of procedure pending. If you have any questions or concerns, please don't hesitate to call.     Sincerely,      Sherrill Vizcarra MD.    Sanya Devine MD

## 2021-05-07 NOTE — PROGRESS NOTES
5/5/2021, 11:30 AM:  Ketorolac Tromethamine Injection, USP    NDC#: [id number]    Lot#: [id number]    Body Part: L dorsogluteal region

## 2021-05-11 ENCOUNTER — APPOINTMENT (OUTPATIENT)
Dept: PHYSICAL THERAPY | Age: 51
End: 2021-05-11
Payer: COMMERCIAL

## 2021-05-11 ENCOUNTER — HOSPITAL ENCOUNTER (OUTPATIENT)
Dept: INTERVENTIONAL RADIOLOGY/VASCULAR | Age: 51
Discharge: HOME | End: 2021-05-11
Payer: COMMERCIAL

## 2021-05-11 DIAGNOSIS — M54.16 LUMBAR RADICULOPATHY: ICD-10-CM

## 2021-05-11 PROCEDURE — 6360000002 HC RX W HCPCS

## 2021-05-11 PROCEDURE — 2709999900 HC NON-CHARGEABLE SUPPLY

## 2021-05-11 PROCEDURE — 64483 NJX AA&/STRD TFRM EPI L/S 1: CPT

## 2021-05-11 PROCEDURE — 6360000004 HC RX CONTRAST MEDICATION: Performed by: RADIOLOGY

## 2021-05-11 PROCEDURE — 2500000003 HC RX 250 WO HCPCS

## 2021-05-11 RX ADMIN — IOHEXOL 20 ML: 180 INJECTION INTRAVENOUS at 13:31

## 2021-05-12 ENCOUNTER — HOSPITAL ENCOUNTER (OUTPATIENT)
Dept: PHYSICAL THERAPY | Age: 51
Setting detail: THERAPIES SERIES
Discharge: HOME | End: 2021-05-12
Payer: COMMERCIAL

## 2021-05-12 PROCEDURE — 97110 THERAPEUTIC EXERCISES: CPT | Performed by: PHYSICAL THERAPIST

## 2021-05-12 PROCEDURE — 97112 NEUROMUSCULAR REEDUCATION: CPT | Performed by: PHYSICAL THERAPIST

## 2021-05-12 NOTE — FLOWSHEET NOTE
[facility], [address] [place] [address], [address] [place] [address]  Phone: [phone]   Fax:     [phone]    Physical Therapy Daily Treatment Note  Date:  2021    Patient Name:  Norris Honeycutt    :  1970  MRN: [id number]  Restrictions/Precautions:    Medical/Treatment Diagnosis Information:  Diagnosis: M53.3 (ICD-10-CM) - Sacroiliac joint dysfunction  Treatment Diagnosis: LBP with radicular symptoms  Insurance/Certification information:  PT Insurance Information: BCBS no auth  Physician Information:  Referring Practitioner: Deepali Mandujano MD  Has the plan of care been signed (Y/N):        []  Yes  [x]  No     Date of Patient follow up with Physician:5 visits if not 50% better      Is this a Progress Report:     []  Yes  [x]  No        If Yes:  Date Range for reporting period:  Beginning  Ending    Progress report will be due (10 Rx or 30 days whichever is less):       Recertification will be due (POC Duration  / 90 days whichever is less):         Visit # Insurance Allowable Auth Required   3    Spine referral refer to MD in 5 visits if no improvement  60 visits []  Yes [x]  No        Functional Scale: [place] 49% deficit   Date assessed:       Latex Allergy:  [x]NO      []YES  Preferred Language for Healthcare:   [x]English       []other:      Pain level:  2-7/10      SUBJECTIVE:    Had epidural yesterday. Sleep is very bad. Cristobal Anthony gave me Ambien and sleep   has been a bit better with meds.    Pain 80% better N/T 20% better since epidural.       OBJECTIVE: See eval        RESTRICTIONS/PRECAUTIONS: none    Exercises/Interventions:   ROM/stretches     SKTC 19k55ike    DKTC 05f37qsa    Prayer stretch/dav pose 27i58bar    Supine HS 55p95ois    Pelvic tilt     Kneeling ant hip stretch 25k12lzq    Cat and camel 97q09ipj    Prone quad stretch push with opposite leg 99g29aer    Strengthening SLR     Quadruped alternate UE reaches     Quadruped alternate LE/UE reaches 61q05vei    Quadruped alternate UE/LE reaches     prone glut sets            \"             With hip ext/UE 46g12qwe  15p96tdo    tspine open book 5x5sec start5/3   tspine seated ext with pec stretch 5x5sec start5/3             Supine 90/90 10 min        Manual Intervention             Prone PA      GISTM/STM      Lumbar Manip      SI Manip      Hip belt mobs      Hip LA distraction R 80iuf1m  L 22zjl3e 4 min              Therapeutic Exercise and NMR EXR  [x] (67756) Provided verbal/tactile cueing for activities related to strengthening, flexibility, endurance, ROM  for improvements in proximal hip and core control with self care, mobility, lifting and ambulation. [x] (83030) Provided verbal/tactile cueing for activities related to improving balance, coordination, kinesthetic sense, posture, motor skill, proprioception  to assist with core control in self care, mobility, lifting, and ambulation.      Therapeutic Activities:    [] (86209 or 50547) Provided verbal/tactile cueing for activities related to improving balance, coordination, kinesthetic sense, posture, motor skill, proprioception and motor activation to allow for proper function  with self care and ADLs  [] (53397) Provided training and instruction to the patient for proper core and proximal hip recruitment and positioning with ambulation re-education     Home Exercise Program:    [x] (83082) Reviewed/Progressed HEP activities related to strengthening, flexibility, endurance, ROM of core, proximal hip and LE for functional self-care, mobility, lifting and ambulation   [] (05012) Reviewed/Progressed HEP activities related to improving balance, coordination, kinesthetic sense, posture, motor skill, proprioception of core, proximal hip and LE for self care, mobility, lifting, and ambulation      Manual Treatments:  PROM / STM / Oscillations-Mobs:  G-I, II, III, IV (PA's, Inf., Post.)  [] (58948) Provided manual therapy to mobilize proximal hip and LS spine soft tissue/joints for the purpose of modulating pain, promoting relaxation,  increasing ROM, reducing/eliminating soft tissue swelling/inflammation/restriction, improving soft tissue extensibility and allowing for proper ROM for normal function with self care, mobility, lifting and ambulation. Modalities:       Charges:  Timed Code Treatment Minutes: 50   Total Treatment Minutes: 240-340       [] EVAL (LOW) 44166 (typically 20 minutes face-to-face)  [] EVAL (MOD) 33639 (typically 30 minutes face-to-face)  [] EVAL (HIGH) 08931 (typically 45 minutes face-to-face)  [] RE-EVAL     [x] IO(53333) x  2   [] IONTO  [x] NMR (96308) x  1   [] VASO  [] Manual (13133) x      [] Other:  [] TA x      [] Mech Traction (85407)  [] ES(attended) (86878)      [] ES (un) (22021):     Goals:   Patient stated goal: RTW, gym program and heavy adls including yard work without pain. []? Progressing: []? Met: []? Not Met: []? Adjusted     Therapist goals for Patient:   Short Term Goals: To be achieved in: 2-4 weeks  1. Independent in HEP and progression per patient tolerance, in order to prevent re-injury. []? Progressing: []? Met: []? Not Met: []? Adjusted   2. Patient will have a decrease in pain to facilitate improvement in movement, function, and ADLs as indicated by Functional Deficits. []? Progressing: []? Met: []? Not Met: []? Adjusted     Long Term Goals: To be achieved in: 12 weeks  1. Disability index score of 15% or less for the [place] to assist with reaching prior level of function. []? Progressing: []? Met: []? Not Met: []? Adjusted  2. Patient will demonstrate increased AROM to WNL, good LS mobility, good hip ROM to allow for proper joint functioning as indicated by patients Functional Deficits.   []? Progressing: []? Met: []? Not Met: []? Adjusted  3.  Patient will demonstrate an increase in Strength to good proximal hip and core activation to allow for proper functional mobility as indicated by patients Functional Deficits. []? Progressing: []? Met: []? Not Met: []? Adjusted  4. Patient will return to  functional activities  without increased symptoms or restriction. Walking steps sleeping transitional movements   []? Progressing: []? Met: []? Not Met: []? Adjusted     Overall Progression Towards Functional goals/ Treatment Progress Update:  [] Patient is progressing as expected towards functional goals listed. [] Progression is slowed due to complexities/Impairments listed. [] Progression has been slowed due to co-morbidities. [x] Plan just implemented, too soon to assess goals progression <30days   [] Goals require adjustment due to lack of progress  [] Patient is not progressing as expected and requires additional follow up with physician  [] Other    Prognosis for POC: [x] Good [] Fair  [] Poor      Patient requires continued skilled intervention: [x] Yes  [] No    Treatment/Activity Tolerance:  [x] Patient able to complete treatment  [] Patient limited by fatigue  [] Patient limited by pain     [] Patient limited by other medical complications  [x] Other: subjective c/o of pain improved however N/T persists. Epidural thus far shows benefit. Pt plans to RTW next week. Pt mentioned earlier this week R UE radicular pain. However pt reports no R UE c/o this date with program      Patient education:Reviewed diagnosis, POC, HEP and its importance. HEP instruction:    Access Code: [email or web address]: [employer] com/Date: 04/27/2021Prepared by: Renata Burt   · Hooklying Single Knee to Chest Stretch - 2-3 x daily - 7 x weekly - 10 reps - 10 hold   · Supine Double Knee to Chest - 2-3 x daily - 7 x weekly - 10 reps - 10 hold   · Supine Hamstring Stretch - 2-3 x daily - 7 x weekly - 10 reps - 10 hold   · Cat-Camel - 2-3 x daily - 7 x weekly - 10 reps - 10 hold   · Beginner Front Arm Support - 2-3 x daily - 7 x weekly - 2 sets - 10 reps   · Prone Gluteal Sets - 2-3 x daily - 7 x weekly - 10 reps - 10 hold   · Beginner Prone Single Leg Raise - 2-3 x daily - 7 x weekly - 10 reps - 10 hold   · Prone Knee Flexion AAROM with Overpressure - 2-3 x daily - 7 x weekly - 10 reps - 10 hold   · Kneeling Hip Flexor Stretch - 2-3 x daily - 7 x weekly - 10 reps - 10 hold  Prognosis: [] Good [] Fair  [] Poor    Patient Requires Follow-up: [x] Yes  [] No     PLAN: See eval  [x] Continue per plan of care [] Alter current plan (see comments)  [] Plan of care initiated [] Hold pending MD visit [] Discharge    Electronically signed by: Dante Judd PT,     *If patient does not return for further follow ups after this date. Please consider this as the patients discharge from physical therapy.

## 2021-05-20 ENCOUNTER — OFFICE VISIT (OUTPATIENT)
Dept: ORTHOPEDIC SURGERY | Age: 51
End: 2021-05-20
Payer: COMMERCIAL

## 2021-05-20 ENCOUNTER — HOSPITAL ENCOUNTER (OUTPATIENT)
Dept: PHYSICAL THERAPY | Age: 51
Setting detail: THERAPIES SERIES
Discharge: HOME | End: 2021-05-20
Payer: COMMERCIAL

## 2021-05-20 VITALS — WEIGHT: 229.94 LBS | BODY MASS INDEX: 30.47 KG/M2 | HEIGHT: 73 IN

## 2021-05-20 DIAGNOSIS — M54.16 RIGHT LUMBAR RADICULOPATHY: ICD-10-CM

## 2021-05-20 DIAGNOSIS — R94.131 ABNORMAL EMG: ICD-10-CM

## 2021-05-20 DIAGNOSIS — M53.3 SACROILIAC JOINT DYSFUNCTION: ICD-10-CM

## 2021-05-20 DIAGNOSIS — M51.26 HERNIATION OF LUMBAR INTERVERTEBRAL DISC: ICD-10-CM

## 2021-05-20 PROCEDURE — 97110 THERAPEUTIC EXERCISES: CPT | Performed by: PHYSICAL THERAPIST

## 2021-05-20 PROCEDURE — 97112 NEUROMUSCULAR REEDUCATION: CPT | Performed by: PHYSICAL THERAPIST

## 2021-05-20 PROCEDURE — 99214 OFFICE O/P EST MOD 30 MIN: CPT | Performed by: INTERNAL MEDICINE

## 2021-05-20 RX ORDER — ZOLPIDEM TARTRATE 10 MG/1
10 TABLET ORAL NIGHTLY PRN
Qty: 14 TABLET | Refills: 0 | Status: SHIPPED | OUTPATIENT
Start: 2021-05-20 | End: 2021-06-03

## 2021-05-20 NOTE — PROGRESS NOTES
Chief Complaint:   Chief Complaint   Patient presents with    Lower Back Pain     pt states approx 50% improvement with LESI, but pain is returning gradually, sharp, throbbing pain in R thigh and groin, and over SIJs          History of Present Illness:       Patient is a 53 y.o. male returns follow up for the above complaint. The patient was last seen approximately 2 weeksago. The symptoms are improving since the last visit. The patient has had no further testing for the problem. He estimates at least 50% improvement overall and he is more functional and pleased with this    He continues with PT. Pain levels 4/10 severity    Back pain is axial and right para axial aching and stabbing in quality. He continues to experience neuritic symptoms involving the anterior thigh numbness and stabbing pins-and-needles in quality. He continues on multimodal pain management inclusive of gabapentin no reliance on narcotic analgesia    He is noted therapeutic benefit from the short-term trial of Ambien    He denies any new onset or progressive weakness of lower extremities or new onset bowel or bladder dysfunction     Past Medical History:      No past medical history on file. Present Medications:         Current Outpatient Medications   Medication Sig Dispense Refill    zolpidem (AMBIEN) 10 MG tablet Take 1 tablet by mouth nightly as needed for Sleep for up to 14 days. 14 tablet 0    gabapentin (NEURONTIN) 300 MG capsule Take 1 tablet every morning and 2 tablets nightly 90 capsule 1    celecoxib (CELEBREX) 200 MG capsule Take 1 capsule by mouth daily 60 capsule 3     No current facility-administered medications for this visit. Allergies:      No Known Allergies        Review of Systems:    Pertinent items are noted in HPI        Vital Signs: There were no vitals filed for this visit.      General Exam:     Constitutional: Patient is adequately groomed with no evidence of malnutrition    Physical Exam: lower back      Primary Exam:    Inspection: No deformity atrophy appreciable curvature      Palpation: No focal trigger point tenderness      Range of Motion: 65/10      Strength: Normal lower extremity-heel rise and toe rise      Special Tests: Negative SLR      Skin: There are no rashes, ulcerations or lesions. Gait: Nonantalgic      Neurovascular -patella tendon reflexes +2 bilaterally normal strength L4 distribution bilaterally. No stigmata of peripheral vascular disease       Additional Comments:        Additional Examinations:                    Office Imaging Results/Procedures PerformedToday:            Office Procedures:   No orders of the defined types were placed in this encounter. Other Outside Imaging and Testing Personally Reviewed:    Priority Sent On From To Message Type    5/11/2021  2:14 PM Andrew, Swoh Incoming Radiology Results From Itzel Taylor MD Results   Radiation Dose Estimates    No radiation information found for this patient   Narrative       Epidural steroid injection, Epidurography       History : right lower extremity radiculopathy       COMMENTS :       The low back was prepped and draped in sterile fashion and lidocaine used for local anesthesia.  Under fluoroscopic guidance, a 22 gauge Chiba needle was advanced into the epidural space at the level of the exiting right L3 nerve root and needle position    was documented with contrast injection.  12 mg Celestone , 1cc (2.5 mg) 0.25% Sensorcaine, and 2 cc sterile saline were injected.  The patient tolerated the procedure without complication.             DIAGNOSIS :       Right L3 transforaminal epidural injection of Celestone and Sensorcaine.           Fluoroscopy time : 0.4 minutes   Number of exposures obtained : 1   Blood loss : minimal (less than 5 cc)   Specimens removed : none               Assessment   Impression: . Encounter Diagnoses   Name Primary?     Herniation of lumbar intervertebral disc     Right lumbar radiculopathy     Abnormal EMG     Sacroiliac joint dysfunction       Right L3 radiculopathy via EMG  L3/L4 leftward disc herniation      Plan:     Repeat L JONATHON right L3 transforaminal  Continue/progress PT and continue I HEP  Multimodal pain management gabapentin, Celebrex and as needed use of muscle relaxant  Ambien nightly as needed short-term refill provided  Activity modification lumbar disc protocol         Orders:      No orders of the defined types were placed in this encounter. Clarence Dorman MD.      Disclaimer: \"This note was dictated with voice recognition software. Though review and correction are routine, we apologize for any errors. \"

## 2021-05-20 NOTE — FLOWSHEET NOTE
The [address] [place] [address],[address], [address], [address] [place] [address]  Phone: [phone]   Fax:     [phone]    Physical Therapy Daily Treatment Note  Date:  2021    Patient Name:  Lawyer Sorensen    :  1970  MRN: [id number]  Restrictions/Precautions:    Medical/Treatment Diagnosis Information:  Diagnosis: M53.3 (ICD-10-CM) - Sacroiliac joint dysfunction  Treatment Diagnosis: LBP with radicular symptoms  Insurance/Certification information:  PT Insurance Information: BCBS no auth  Physician Information:  Referring Practitioner: Delphine Hernández MD  Has the plan of care been signed (Y/N):        []  Yes  [x]  No     Date of Patient follow up with Physician:5 visits if not 50% better      Is this a Progress Report:     []  Yes  [x]  No        If Yes:  Date Range for reporting period:  Beginning  Ending    Progress report will be due (10 Rx or 30 days whichever is less):       Recertification will be due (POC Duration  / 90 days whichever is less):         Visit # Insurance Allowable Auth Required   4    Spine referral refer to MD in 5 visits if no improvement  60 visits []  Yes [x]  No        Functional Scale: quebec 49% deficit   Date assessed:       Latex Allergy:  [x]NO      []YES  Preferred Language for Healthcare:   [x]English       []other:      Pain level:  2-7/10      SUBJECTIVE:   overall 50% of normal   Have RTW. Intensity of N/T some better but still get tingling and pain. Also having R UE tingling. Seeing MD today.   I want to sign up for another injection    OBJECTIVE: See eval        RESTRICTIONS/PRECAUTIONS: none    Exercises/Interventions:   ROM/stretches     SKTC 10d48bdn    DKTC 14k39lmn    Prayer stretch/dav pose 59j57cqn    Supine HS 92m43gkt    Pelvic tilt     Kneeling ant hip stretch 67x70unl    Cat and camel 33p63ose    Prone lying and prone prop Attempted 21 but did not centralize symptoms     Prone quad stretch push with opposite leg 36l75smr         Strengthening     SLR     Quadruped alternate UE reaches     Quadruped alternate LE/UE reaches 98x31odb    Quadruped alternate UE/LE reaches     prone glut sets            \"             With hip ext/UE 34v50kjq  63e37jli    tspine open book 5x5sec start5/3   tspine seated ext with pec stretch 5x5sec start5/3             Supine 90/90         Manual Intervention             Prone PA      GISTM/STM      Lumbar Manip      SI Manip      Hip belt mobs      Hip LA distraction R 47jlp2h  L 18gqd2s B 03tbpp5 5 min              Therapeutic Exercise and NMR EXR  [x] (76376) Provided verbal/tactile cueing for activities related to strengthening, flexibility, endurance, ROM  for improvements in proximal hip and core control with self care, mobility, lifting and ambulation. [x] (24209) Provided verbal/tactile cueing for activities related to improving balance, coordination, kinesthetic sense, posture, motor skill, proprioception  to assist with core control in self care, mobility, lifting, and ambulation.      Therapeutic Activities:    [] (21973 or 99608) Provided verbal/tactile cueing for activities related to improving balance, coordination, kinesthetic sense, posture, motor skill, proprioception and motor activation to allow for proper function  with self care and ADLs  [] (04549) Provided training and instruction to the patient for proper core and proximal hip recruitment and positioning with ambulation re-education     Home Exercise Program:    [x] (47136) Reviewed/Progressed HEP activities related to strengthening, flexibility, endurance, ROM of core, proximal hip and LE for functional self-care, mobility, lifting and ambulation   [] (58611) Reviewed/Progressed HEP activities related to improving balance, coordination, kinesthetic sense, posture, motor skill, proprioception of core, proximal hip and LE for self care, mobility, lifting, Met: []? Adjusted  3. Patient will demonstrate an increase in Strength to good proximal hip and core activation to allow for proper functional mobility as indicated by patients Functional Deficits. []? Progressing: []? Met: []? Not Met: []? Adjusted  4. Patient will return to  functional activities  without increased symptoms or restriction. Walking steps sleeping transitional movements   []? Progressing: []? Met: []? Not Met: []? Adjusted     Overall Progression Towards Functional goals/ Treatment Progress Update:  [] Patient is progressing as expected towards functional goals listed. [] Progression is slowed due to complexities/Impairments listed. [] Progression has been slowed due to co-morbidities. [x] Plan just implemented, too soon to assess goals progression <30days   [] Goals require adjustment due to lack of progress  [] Patient is not progressing as expected and requires additional follow up with physician  [] Other    Prognosis for POC: [x] Good [] Fair  [] Poor      Patient requires continued skilled intervention: [x] Yes  [] No    Treatment/Activity Tolerance:  [x] Patient able to complete treatment  [] Patient limited by fatigue  [] Patient limited by pain     [] Patient limited by other medical complications  [x] Other: subjective c/o improved by about 50%. Pt reports sleeping is better. Epidural thus far shows benefit. Pt  RTW this week. R UE radicular pain also continues. attempted  Extension preference today without benefit. Plan to continue flexion approach. Patient education:Reviewed diagnosis, POC, HEP and its importance. HEP instruction:    Access Code: [email or web address]: "[email or web address]. com/Date: 04/27/2021Prepared by: Ari Paz   · Hooklying Single Knee to Chest Stretch - 2-3 x daily - 7 x weekly - 10 reps - 10 hold   · Supine Double Knee to Chest - 2-3 x daily - 7 x weekly - 10 reps - 10 hold   · Supine Hamstring Stretch - 2-3 x daily - 7 x weekly - 10 reps - 10 hold   · Cat-Camel - 2-3 x daily - 7 x weekly - 10 reps - 10 hold   · Beginner Front Arm Support - 2-3 x daily - 7 x weekly - 2 sets - 10 reps   · Prone Gluteal Sets - 2-3 x daily - 7 x weekly - 10 reps - 10 hold   · Beginner Prone Single Leg Raise - 2-3 x daily - 7 x weekly - 10 reps - 10 hold   · Prone Knee Flexion AAROM with Overpressure - 2-3 x daily - 7 x weekly - 10 reps - 10 hold   · Kneeling Hip Flexor Stretch - 2-3 x daily - 7 x weekly - 10 reps - 10 hold  Prognosis: [] Good [] Fair  [] Poor    Patient Requires Follow-up: [x] Yes  [] No     PLAN: See eval  [x] Continue per plan of care [] Alter current plan (see comments)  [] Plan of care initiated [] Hold pending MD visit [] Discharge    Electronically signed by: Ramon Hughes PT      *If patient does not return for further follow ups after this date. Please consider this as the patients discharge from physical therapy.

## 2021-05-24 ENCOUNTER — TELEPHONE (OUTPATIENT)
Dept: ORTHOPEDIC SURGERY | Age: 51
End: 2021-05-24

## 2021-05-24 NOTE — TELEPHONE ENCOUNTER
General Question     Subject: ORDER FOR EPIDURAL  Patient and /or Facility Request: PATIENT  Contact Number: [phone]

## 2021-05-25 ENCOUNTER — TELEPHONE (OUTPATIENT)
Dept: ORTHOPEDIC SURGERY | Age: 51
End: 2021-05-25

## 2021-05-26 ENCOUNTER — HOSPITAL ENCOUNTER (OUTPATIENT)
Dept: PHYSICAL THERAPY | Age: 51
Setting detail: THERAPIES SERIES
Discharge: HOME | End: 2021-05-26
Payer: COMMERCIAL

## 2021-05-26 PROCEDURE — 97140 MANUAL THERAPY 1/> REGIONS: CPT

## 2021-05-26 PROCEDURE — 97112 NEUROMUSCULAR REEDUCATION: CPT

## 2021-05-26 PROCEDURE — 97110 THERAPEUTIC EXERCISES: CPT

## 2021-05-26 NOTE — FLOWSHEET NOTE
The [address] [place] [address], [address], [address] [place] [address]  Phone: [phone]   Fax:     [phone]    Physical Therapy Daily Treatment Note  Date:  2021    Patient Name:  Lizette Miranda    :  1970  MRN: [id number]  Restrictions/Precautions:    Medical/Treatment Diagnosis Information:  Diagnosis: M53.3 (ICD-10-CM) - Sacroiliac joint dysfunction  Treatment Diagnosis: LBP with radicular symptoms  Insurance/Certification information:  PT Insurance Information: BCBS no auth  Physician Information:  Referring Practitioner: Екатерина Miranda MD  Has the plan of care been signed (Y/N):        []  Yes  [x]  No     Date of Patient follow up with Physician:5 visits if not 50% better      Is this a Progress Report:     []  Yes  [x]  No        If Yes:  Date Range for reporting period:  Beginning  Ending    Progress report will be due (10 Rx or 30 days whichever is less):       Recertification will be due (POC Duration  / 90 days whichever is less):         Visit # Insurance Allowable Auth Required   5    Spine referral refer to MD in 5 visits if no improvement  60 visits []  Yes [x]  No        Functional Scale: [place] 49% deficit   Date assessed:       Latex Allergy:  [x]NO      []YES  Preferred Language for Healthcare:   [x]English       []other:      Pain level:  2/10 5/26     SUBJECTIVE:   Pt states he is not going to be able to get injection prior to vacation d/t needing to wait 3 weeks prior to injection. Overall feeling better but still has tingling in R quad but leg not giving out as much but has been babying activity level.      OBJECTIVE: See eval        RESTRICTIONS/PRECAUTIONS: none    Exercises/Interventions:   ROM/stretches     SKTC 15b12chy    DKTC 39x56dny    Prayer stretch/dav pose 41m88rld    Supine HS 12l57bgy    Pelvic tilt     Kneeling ant hip stretch 05l18dcr Cues to avoid excessive lumbar extension    Cat and camel 46j60eha    Prone lying and prone prop      Prone quad stretch push with opposite leg 09r60msv 5/26 pillow under stomach, 1/2 roll under thigh         Strengthening     SLR     Quadruped alternate UE reaches     Quadruped alternate LE/UE reaches 31w29dbe 5/26 cues to avoid lumba rextension    Quadruped alternate UE/LE reaches     prone glut sets            \"             With hip ext/UE     tspine open book  start5/3   tspine seated ext with pec stretch  start5/3             Supine 90/90         Manual Intervention             Prone PA      GISTM/STM      Lumbar Manip      SI Manip      Hip belt mobs      Hip LA distraction R 18qqe6i  L 83big5q B 66delx3      Lumbar manual traction 10 min LE on SB      Therapeutic Exercise and NMR EXR  [x] (51595) Provided verbal/tactile cueing for activities related to strengthening, flexibility, endurance, ROM  for improvements in proximal hip and core control with self care, mobility, lifting and ambulation. [x] (60584) Provided verbal/tactile cueing for activities related to improving balance, coordination, kinesthetic sense, posture, motor skill, proprioception  to assist with core control in self care, mobility, lifting, and ambulation.      Therapeutic Activities:    [] (89418 or 04031) Provided verbal/tactile cueing for activities related to improving balance, coordination, kinesthetic sense, posture, motor skill, proprioception and motor activation to allow for proper function  with self care and ADLs  [] (10613) Provided training and instruction to the patient for proper core and proximal hip recruitment and positioning with ambulation re-education     Home Exercise Program:    [x] (43586) Reviewed/Progressed HEP activities related to strengthening, flexibility, endurance, ROM of core, proximal hip and LE for functional self-care, mobility, lifting and ambulation   [] (78486) Reviewed/Progressed HEP activities related to improving balance, coordination, kinesthetic sense, posture, motor skill, proprioception of core, proximal hip and LE for self care, mobility, lifting, and ambulation      Manual Treatments:  PROM / STM / Oscillations-Mobs:  G-I, II, III, IV (PA's, Inf., Post.)  [] (62537) Provided manual therapy to mobilize proximal hip and LS spine soft tissue/joints for the purpose of modulating pain, promoting relaxation,  increasing ROM, reducing/eliminating soft tissue swelling/inflammation/restriction, improving soft tissue extensibility and allowing for proper ROM for normal function with self care, mobility, lifting and ambulation. Modalities:       Charges:  Timed Code Treatment Minutes: 40   Total Treatment Minutes: 4:45-5:32  47'       [] EVAL (LOW) 29329 (typically 20 minutes face-to-face)  [] EVAL (MOD) 84315 (typically 30 minutes face-to-face)  [] EVAL (HIGH) 73946 (typically 45 minutes face-to-face)  [] RE-EVAL     [x] GT(73966) x  1   [] IONTO  [x] NMR (68982) x  1  [] VASO  [x] Manual (48191) x 1     [] Other:  [] TA x      [] Mech Traction (32151)  [] ES(attended) (48314)      [] ES (un) (04187):     Goals:   Patient stated goal: RTW, gym program and heavy adls including yard work without pain. []? Progressing: []? Met: []? Not Met: []? Adjusted     Therapist goals for Patient:   Short Term Goals: To be achieved in: 2-4 weeks  1. Independent in HEP and progression per patient tolerance, in order to prevent re-injury. []? Progressing: []? Met: []? Not Met: []? Adjusted   2. Patient will have a decrease in pain to facilitate improvement in movement, function, and ADLs as indicated by Functional Deficits. []? Progressing: []? Met: []? Not Met: []? Adjusted     Long Term Goals: To be achieved in: 12 weeks  1. Disability index score of 15% or less for the [place] to assist with reaching prior level of function. []? Progressing: []? Met: []? Not Met: []? Adjusted  2.  Patient will demonstrate increased AROM to WNL, good LS mobility, good hip ROM to allow for proper joint functioning as indicated by patients Functional Deficits.   []? Progressing: []? Met: []? Not Met: []? Adjusted  3. Patient will demonstrate an increase in Strength to good proximal hip and core activation to allow for proper functional mobility as indicated by patients Functional Deficits. []? Progressing: []? Met: []? Not Met: []? Adjusted  4. Patient will return to  functional activities  without increased symptoms or restriction. Walking steps sleeping transitional movements   []? Progressing: []? Met: []? Not Met: []? Adjusted     Overall Progression Towards Functional goals/ Treatment Progress Update:  [] Patient is progressing as expected towards functional goals listed. [] Progression is slowed due to complexities/Impairments listed. [] Progression has been slowed due to co-morbidities. [x] Plan just implemented, too soon to assess goals progression <30days   [] Goals require adjustment due to lack of progress  [] Patient is not progressing as expected and requires additional follow up with physician  [] Other    Prognosis for POC: [x] Good [] Fair  [] Poor      Patient requires continued skilled intervention: [x] Yes  [] No    Treatment/Activity Tolerance:  [x] Patient able to complete treatment  [] Patient limited by fatigue  [] Patient limited by pain     [] Patient limited by other medical complications  [x] Other:  Pt responded well to manual traction with LE on SB. Cues required during hip flexor strength and quadruped exercises to avoid excessive lumbar extension. Overall appears to be responding well to stretches but needs to continue to be aware of compensation into excessive lumbar extension. Unable to complete all exercises d/t pt having to leave for a .     Patient education:Reviewed diagnosis, POC, HEP and its importance. HEP instruction:    Access Code: [email or web address]: [email or web address]/Date: repared by: Suzanne Nice   · Hooklying Single Knee to Chest Stretch - 2-3 x daily - 7 x weekly - 10 reps - 10 hold   · Supine Double Knee to Chest - 2-3 x daily - 7 x weekly - 10 reps - 10 hold   · Supine Hamstring Stretch - 2-3 x daily - 7 x weekly - 10 reps - 10 hold   · Cat-Camel - 2-3 x daily - 7 x weekly - 10 reps - 10 hold   · Beginner Front Arm Support - 2-3 x daily - 7 x weekly - 2 sets - 10 reps   · Prone Gluteal Sets - 2-3 x daily - 7 x weekly - 10 reps - 10 hold   · Beginner Prone Single Leg Raise - 2-3 x daily - 7 x weekly - 10 reps - 10 hold   · Prone Knee Flexion AAROM with Overpressure - 2-3 x daily - 7 x weekly - 10 reps - 10 hold   · Kneeling Hip Flexor Stretch - 2-3 x daily - 7 x weekly - 10 reps - 10 hold  Prognosis: [] Good [] Fair  [] Poor    Patient Requires Follow-up: [x] Yes  [] No     PLAN: See eval  [x] Continue per plan of care [] Alter current plan (see comments)  [] Plan of care initiated [] Hold pending MD visit [] Discharge  5/26 Pt going on vacation next week and will f/u when he returns. Electronically signed by: Santi Gusman, PT      *If patient does not return for further follow ups after this date. Please consider this as the patients discharge from physical therapy.

## 2021-05-26 NOTE — TELEPHONE ENCOUNTER
Contacted pt to discuss. Have reached out to [facility] and confirmed order was received and they are working on it.

## 2021-06-08 ENCOUNTER — HOSPITAL ENCOUNTER (OUTPATIENT)
Dept: PHYSICAL THERAPY | Age: 51
Setting detail: THERAPIES SERIES
Discharge: HOME | End: 2021-06-08
Payer: COMMERCIAL

## 2021-06-08 PROCEDURE — 97112 NEUROMUSCULAR REEDUCATION: CPT

## 2021-06-08 PROCEDURE — 97110 THERAPEUTIC EXERCISES: CPT

## 2021-06-08 NOTE — PROGRESS NOTES
The [address] [place] [address],[address], [address], [address] [place] [address]  Phone: [phone]   Fax:     [phone]    Physical Therapy Daily Treatment Note  Date:  2021    Patient Name:  Radha Garcia    :  1970  MRN: [id number]  Restrictions/Precautions:    Medical/Treatment Diagnosis Information:  Diagnosis: M53.3 (ICD-10-CM) - Sacroiliac joint dysfunction  Treatment Diagnosis: LBP with radicular symptoms  Insurance/Certification information:  PT Insurance Information: BCBS no auth  Physician Information:  Referring Practitioner: Polo Medina MD  Has the plan of care been signed (Y/N):        []  Yes  [x]  No     Date of Patient follow up with Physician:5 visits if not 50% better      Is this a Progress Report:     [x]  Yes  []  No        If Yes:  Date Range for reporting period:  Beginning  Ending     Progress report will be due (10 Rx or 30 days whichever is less):       Recertification will be due (POC Duration  / 90 days whichever is less):         Visit # Insurance Allowable Auth Required   6    Spine referral refer to MD in 5 visits if no improvement  60 visits []  Yes [x]  No        Functional Scale:   [place] 37% deficit   Date assessed:   [place] 49% deficit   Date assessed:       Latex Allergy:  [x]NO      []YES  Preferred Language for Healthcare:   [x]English       []other:      Pain level:  2/10 6/8     SUBJECTIVE:   Pt states he completed his stretches while on HEP and swam a little which felt good. Overall doing well with sitting. Will occasionally get sharp pain with twisting.  No symptoms traveling down R LE     OBJECTIVE: See eval   ROM   Comments   Trunk flexion 25% restriction     Trunk extension ~20%      Trunk R sidebend Below lateral joint line     Trunk L sidebend Below lateral joint line       Strength Left Right Comments   Hip flexion(L2) 4 4     Knee extension(L3) 5 5   ADLs  [] (84880) Provided training and instruction to the patient for proper core and proximal hip recruitment and positioning with ambulation re-education     Home Exercise Program:    [x] (94370) Reviewed/Progressed HEP activities related to strengthening, flexibility, endurance, ROM of core, proximal hip and LE for functional self-care, mobility, lifting and ambulation   [] (36654) Reviewed/Progressed HEP activities related to improving balance, coordination, kinesthetic sense, posture, motor skill, proprioception of core, proximal hip and LE for self care, mobility, lifting, and ambulation      Manual Treatments:  PROM / STM / Oscillations-Mobs:  G-I, II, III, IV (PA's, Inf., Post.)  [] (34924) Provided manual therapy to mobilize proximal hip and LS spine soft tissue/joints for the purpose of modulating pain, promoting relaxation,  increasing ROM, reducing/eliminating soft tissue swelling/inflammation/restriction, improving soft tissue extensibility and allowing for proper ROM for normal function with self care, mobility, lifting and ambulation. Modalities:       Charges:  Timed Code Treatment Minutes: 45'   Total Treatment Minutes: 5:01-5:49  48'       [] EVAL (LOW) 41420 (typically 20 minutes face-to-face)  [] EVAL (MOD) 76710 (typically 30 minutes face-to-face)  [] EVAL (HIGH) 89167 (typically 45 minutes face-to-face)  [] RE-EVAL     [x] OU(11905) x  2   [] IONTO  [x] NMR (74334) x  1  [] VASO  [] Manual (01292) x      [] Other:  [] TA x      [] Mech Traction (79711)  [] ES(attended) (65111)      [] ES (un) (10122):     Goals:   Patient stated goal: RTW, gym program and heavy adls including yard work without pain. [x]? Progressing: []? Met: []? Not Met: []? Adjusted     Therapist goals for Patient:   Short Term Goals: To be achieved in: 2-4 weeks  1. Independent in HEP and progression per patient tolerance, in order to prevent re-injury. [x]? Progressing: []? Met: []? Not Met: []? Adjusted   2. Patient will have a decrease in pain to facilitate improvement in movement, function, and ADLs as indicated by Functional Deficits. [x]? Progressing: []? Met: []? Not Met: []? Adjusted     Long Term Goals: To be achieved in: 12 weeks  1. Disability index score of 15% or less for the Sara to assist with reaching prior level of function. [x]? Progressing: []? Met: []? Not Met: []? Adjusted  2. Patient will demonstrate increased AROM to WNL, good LS mobility, good hip ROM to allow for proper joint functioning as indicated by patients Functional Deficits.   []? Progressing: []? Met: []? Not Met: []? Adjusted  3. Patient will demonstrate an increase in Strength to good proximal hip and core activation to allow for proper functional mobility as indicated by patients Functional Deficits. [x]? Progressing: []? Met: []? Not Met: []? Adjusted  4. Patient will return to  functional activities  without increased symptoms or restriction. Walking steps sleeping transitional movements   [x]? Progressing: []? Met: []? Not Met: []? Adjusted     Overall Progression Towards Functional goals/ Treatment Progress Update:  [x] Patient is progressing as expected towards functional goals listed with decrease in radicular symptoms as well as increase in mobility and strength. He continues to have mild trunk mobility restrictions as well as decrease hip strength and is therefore appropriate for continued PT in order to further increase function and assist with safe return to weight lifting independently. [] Progression is slowed due to complexities/Impairments listed. [] Progression has been slowed due to co-morbidities.   [] Plan just implemented, too soon to assess goals progression <30days   [] Goals require adjustment due to lack of progress  [] Patient is not progressing as expected and requires additional follow up with physician  [] Other    Prognosis for POC: [x] Good [] Fair  [] Poor      Patient requires continued skilled intervention: [x] Yes  [] No    Treatment/Activity Tolerance:  [x] Patient able to complete treatment  [] Patient limited by fatigue  [] Patient limited by pain     [] Patient limited by other medical complications  [x] Other: 6/8 Fatigued with increase in intensity of strengthening exercises but no adverse effects noted or reported. Patient education:Reviewed diagnosis, POC, HEP and its importance. HEP instruction:   ·  Access Code: [email or web address]  Prognosis: [] Good [] Fair  [] Poor    Patient Requires Follow-up: [x] Yes  [] No     PLAN: See eval  [x] Continue per plan of care [] Alter current plan (see comments)  [] Plan of care initiated [] Hold pending MD visit [] Discharge  6/8 Pt to receive cortisone injection on 6/11. Continue with PT to further increase hip and abdominal strength in order to ensure safe return to lifting independently. Electronically signed by: Jody Ayers, PT      *If patient does not return for further follow ups after this date. Please consider this as the patients discharge from physical therapy.

## 2021-06-11 ENCOUNTER — HOSPITAL ENCOUNTER (OUTPATIENT)
Dept: INTERVENTIONAL RADIOLOGY/VASCULAR | Age: 51
Discharge: HOME | End: 2021-06-11
Payer: COMMERCIAL

## 2021-06-11 DIAGNOSIS — M54.16 LUMBAR RADICULOPATHY: ICD-10-CM

## 2021-06-11 PROCEDURE — 6360000004 HC RX CONTRAST MEDICATION: Performed by: RADIOLOGY

## 2021-06-11 PROCEDURE — 2709999900 HC NON-CHARGEABLE SUPPLY

## 2021-06-11 PROCEDURE — 64483 NJX AA&/STRD TFRM EPI L/S 1: CPT

## 2021-06-11 PROCEDURE — 6360000002 HC RX W HCPCS

## 2021-06-11 PROCEDURE — 2500000003 HC RX 250 WO HCPCS

## 2021-06-11 RX ADMIN — IOHEXOL 20 ML: 180 INJECTION INTRAVENOUS at 10:16

## 2021-06-25 ENCOUNTER — HOSPITAL ENCOUNTER (OUTPATIENT)
Dept: PHYSICAL THERAPY | Age: 51
Setting detail: THERAPIES SERIES
Discharge: HOME | End: 2021-06-25
Payer: COMMERCIAL

## 2021-06-25 PROCEDURE — 97112 NEUROMUSCULAR REEDUCATION: CPT

## 2021-06-25 PROCEDURE — 97110 THERAPEUTIC EXERCISES: CPT

## 2021-06-25 PROCEDURE — 97140 MANUAL THERAPY 1/> REGIONS: CPT

## 2021-06-25 NOTE — FLOWSHEET NOTE
Hip flexion(L2) 4 4     Knee extension(L3) 5 5     Knee flexion(S1-2) 4+ 4+     Ankle dorsiflexion(L4) 5 5     Toe extension(L5) 5 5     Ankle eversion/plantar flexion(S1) 5 5     Hip abd   4+  4+       Special tests   Comments   Slump test Neg bilat          RESTRICTIONS/PRECAUTIONS: none    Exercises/Interventions:   ROM/stretches     SKTC     DKTC     Prayer stretch/dav pose     SB roll ins 10\"hx10  6/17   SB LTR 10\"hx10 bilat  6/17   Supine HS 6/25 withheld d/t time   Hip adductor  6/25 withheld d/t time   Kneeling ant hip stretch Cues to avoid excessive lumbar extension    Cat and camel    Prone lying and prone prop      Prone quad stretch push with opposite leg  5/26 pillow under stomach, 1/2 roll under thigh         Strengthening     TA Heel tap 3x10 alt LE 6/8 supine 90/90   LTR 3x10 bilat  6/8 LE 90/90 with BS    Quadruped alternate LE/UE reaches  6/25 withheld d/t time   SL bridges 3x10 bilat 6/8 BS    SLR abd  6/25 progress with side stepping   Side stepping  Blue loop @ knees 3 laps 6/25   Pallof press Blue TB 3x10 bilat 6/17 verbal cueing on sarah core    prone glut sets            \"             With hip ext/UE     tspine open book  start5/3   tspine seated ext with pec stretch  start5/3             Supine 90/90     6/25 Educated on proper glut activation with activities such as sit to stand to avoid excessive lumbar extension.  Educated on proper glut and TA activation in standing to avoid excessive pressure placed on lumbar spine x8'    Manual Intervention             Prone PA      GISTM/STM      Lumbar Manip      SI Manip      Hip belt mobs      Hip LA distraction R [id number]  L [id number] B 12gikb5      Lumbar manual traction 8 min LE on SB 6/25 continued d/t occasional LE symptoms and relief experienced with traction     Therapeutic Exercise and NMR EXR  [x] (72406) Provided verbal/tactile cueing for activities related to strengthening, flexibility, endurance, ROM  for improvements in proximal 97951 (typically 45 minutes face-to-face)  [] RE-EVAL     [x] BK(77921) x  1   [] IONTO  [x] NMR (08856) x 1  [] VASO  [x] Manual (81450) x 1    [] Other:  [] TA x      [] Mech Traction (34151)  [] ES(attended) (64454)      [] ES (un) (89253):     Goals:   Patient stated goal: RTW, gym program and heavy adls including yard work without pain. [x]? Progressing: []? Met: []? Not Met: []? Adjusted     Therapist goals for Patient:   Short Term Goals: To be achieved in: 2-4 weeks  1. Independent in HEP and progression per patient tolerance, in order to prevent re-injury. [x]? Progressing: []? Met: []? Not Met: []? Adjusted   2. Patient will have a decrease in pain to facilitate improvement in movement, function, and ADLs as indicated by Functional Deficits. [x]? Progressing: []? Met: []? Not Met: []? Adjusted     Long Term Goals: To be achieved in: 12 weeks  1. Disability index score of 15% or less for the [place] to assist with reaching prior level of function. [x]? Progressing: []? Met: []? Not Met: []? Adjusted  2. Patient will demonstrate increased AROM to WNL, good LS mobility, good hip ROM to allow for proper joint functioning as indicated by patients Functional Deficits.   []? Progressing: []? Met: []? Not Met: []? Adjusted  3. Patient will demonstrate an increase in Strength to good proximal hip and core activation to allow for proper functional mobility as indicated by patients Functional Deficits. [x]? Progressing: []? Met: []? Not Met: []? Adjusted  4. Patient will return to  functional activities  without increased symptoms or restriction. Walking steps sleeping transitional movements   [x]? Progressing: []? Met: []? Not Met: []? Adjusted     Overall Progression Towards Functional goals/ Treatment Progress Update:  [x] Patient is progressing as expected towards functional goals listed with decrease in radicular symptoms as well as increase in mobility and strength.  He continues to have mild trunk mobility restrictions as well as decrease hip strength and is therefore appropriate for continued PT in order to further increase function and assist with safe return to weight lifting independently. [] Progression is slowed due to complexities/Impairments listed. [] Progression has been slowed due to co-morbidities. [] Plan just implemented, too soon to assess goals progression <30days   [] Goals require adjustment due to lack of progress  [] Patient is not progressing as expected and requires additional follow up with physician  [] Other    Prognosis for POC: [x] Good [] Fair  [] Poor      Patient requires continued skilled intervention: [x] Yes  [] No    Treatment/Activity Tolerance:  [x] Patient able to complete treatment  [] Patient limited by fatigue  [] Patient limited by pain     [] Patient limited by other medical complications  [x] Other: 6/25 Pt overall continuing to do well with treatment based on decrease in severity of symptoms and consistency of R LE radicular symptoms. Relief reported with manual lumbar traction. Fatigued with strengthening exercises and cues required during side stepping to avoid lumbar compensation but no adverse effects noted or reported. Responded well to education and training of proper muscle activation with sit to stand and posture in standing. Patient education:Reviewed diagnosis, POC, HEP and its importance. 6/17: Pt asked about swimming laps and was educated on using caution d/t potential increase in symptoms with repeated flexion and extension in certain strokes. Suggested using kick board to keep neutral spine to achieve cardio endurance of exercise.     HEP instruction:   ·  Access Code: [email or web address]  Prognosis: [] Good [] Fair  [] Poor    Patient Requires Follow-up: [x] Yes  [] No     PLAN: See eval  [x] Continue per plan of care [] Alter current plan (see comments)  [] Plan of care initiated [] Hold pending MD visit [] Discharge  6/25 Pt to continue PT to improve lumbar ROM and increase core strength to decrease symptom reproduction. Complete progress note NPV. Katja Walters, Student Physical Therapist  Therapist was present, directed the patient's care, made skilled judgement, and was responsible for assessment and treatment of the patient. Electronically signed by: Shahana Clark PT      *If patient does not return for further follow ups after this date. Please consider this as the patients discharge from physical therapy.

## 2021-07-08 ENCOUNTER — OFFICE VISIT (OUTPATIENT)
Dept: ORTHOPEDIC SURGERY | Age: 51
End: 2021-07-08
Payer: COMMERCIAL

## 2021-07-08 ENCOUNTER — HOSPITAL ENCOUNTER (OUTPATIENT)
Dept: PHYSICAL THERAPY | Age: 51
Setting detail: THERAPIES SERIES
Discharge: HOME | End: 2021-07-08
Payer: COMMERCIAL

## 2021-07-08 VITALS — WEIGHT: 229.94 LBS | BODY MASS INDEX: 30.47 KG/M2 | HEIGHT: 73 IN

## 2021-07-08 DIAGNOSIS — M79.2 RADICULAR PAIN IN RIGHT ARM: ICD-10-CM

## 2021-07-08 DIAGNOSIS — R94.131 ABNORMAL EMG: ICD-10-CM

## 2021-07-08 DIAGNOSIS — M51.26 HERNIATED LUMBAR INTERVERTEBRAL DISC: Primary | ICD-10-CM

## 2021-07-08 DIAGNOSIS — M54.2 NECK PAIN: ICD-10-CM

## 2021-07-08 DIAGNOSIS — R20.2 PARESTHESIA OF RIGHT LOWER EXTREMITY: ICD-10-CM

## 2021-07-08 DIAGNOSIS — M54.2 DISCOGENIC CERVICAL PAIN: ICD-10-CM

## 2021-07-08 DIAGNOSIS — M50.30 DDD (DEGENERATIVE DISC DISEASE), CERVICAL: ICD-10-CM

## 2021-07-08 DIAGNOSIS — M47.812 CERVICAL SPONDYLOSIS: ICD-10-CM

## 2021-07-08 DIAGNOSIS — M54.16 RIGHT LUMBAR RADICULOPATHY: ICD-10-CM

## 2021-07-08 PROCEDURE — 97012 MECHANICAL TRACTION THERAPY: CPT

## 2021-07-08 PROCEDURE — 99214 OFFICE O/P EST MOD 30 MIN: CPT | Performed by: INTERNAL MEDICINE

## 2021-07-08 PROCEDURE — 97110 THERAPEUTIC EXERCISES: CPT

## 2021-07-08 PROCEDURE — 97112 NEUROMUSCULAR REEDUCATION: CPT

## 2021-07-08 RX ORDER — GABAPENTIN 300 MG/1
300 CAPSULE ORAL 2 TIMES DAILY
Qty: 60 CAPSULE | Refills: 1 | Status: SHIPPED | OUTPATIENT
Start: 2021-07-08 | End: 2021-09-02 | Stop reason: SDUPTHER

## 2021-07-08 NOTE — FLOWSHEET NOTE
The [address] [place] [address],[address], [address], [address] [place] [address]  Phone: [phone]   Fax:     [phone]    Physical Therapy Daily Treatment Note  Date:  2021    Patient Name:  Aquiles Infante    :  1970  MRN: [id number]  Restrictions/Precautions:    Medical/Treatment Diagnosis Information:  Diagnosis: M53.3 (ICD-10-CM) - Sacroiliac joint dysfunction  Treatment Diagnosis: LBP with radicular symptoms  Insurance/Certification information:  PT Insurance Information: BCBS no auth  Physician Information:  Referring Practitioner: Natali Shah MD  Has the plan of care been signed (Y/N):        []  Yes  [x]  No     Date of Patient follow up with Physician:5 visits if not 50% better      Is this a Progress Report:     [x]  Yes  []  No        If Yes:  Date Range for reporting period:  Beginning  Ending      Progress report will be due (10 Rx or 30 days whichever is less):       Recertification will be due (POC Duration  / 90 days whichever is less):         Visit # Insurance Allowable Auth Required   9    Spine referral refer to MD in 5 visits if no improvement  60 visits []  Yes [x]  No        Functional Scale:   [place] 34% deficit   Date assessed:   [place] 37% deficit   Date assessed:   [place] 49% deficit   Date assessed:       Latex Allergy:  [x]NO      []YES  Preferred Language for Healthcare:   [x]English       []other:      Pain level:  2/10 7/8    SUBJECTIVE:   Pt states he has noticed slight increase in tingling down R inner thigh and top of R knee described as sporadic with ocassional jolt. States he continues to be cautious with lifting and has done some yard work but reports keeping it light.      OBJECTIVE: Updated below   ROM   Comments   Trunk flexion 25% restriction     Trunk extension ~20%      Trunk R sidebend Below lateral joint line     Trunk L sidebend Below lateral joint line   Strength Left Right Comments   Hip flexion(L2) 4+ 4+     Knee extension(L3) 5 5     Knee flexion(S1-2) 5 5     Ankle dorsiflexion(L4) 5 5     Toe extension(L5) 5 5     Ankle eversion/plantar flexion(S1) 5 5     Hip abd   4+  4+       Special tests   Comments   Slump test Neg R  Pos L for symptoms R lumbar spine     Facet closure Neg bilat Completed in standing    SLR Neg bilat        RESTRICTIONS/PRECAUTIONS: none    Exercises/Interventions:   ROM/stretches     SKTC     DKTC     Prayer stretch/dav pose     SB roll ins 10\"hx10  6/17   SB LTR 10\"hx10 bilat  6/17   Supine HS 6/25 withheld d/t time   Hip adductor  6/25 withheld d/t time   Kneeling ant hip stretch Cues to avoid excessive lumbar extension    Cat and camel    Prone lying and prone prop      Prone quad stretch push with opposite leg  5/26 pillow under stomach, 1/2 roll under thigh         Strengthening     TA Heel tap 3x10 alt LE 6/8 supine 90/90   LTR 3x10 bilat  6/8 LE 90/90 with BS    TA overhead 4# med ball 3x10 6/8 supine 90/90    Quadruped alternate LE/UE reaches  6/25 withheld d/t time   SL bridges 3x10 bilat 6/8 BS    SLR abd  6/25 progress with side stepping   Side stepping  6/25   Pallof press 6/17 verbal cueing on sarah core    prone glut sets            \"             With hip ext/UE     tspine open book  start5/3   tspine seated ext with pec stretch  start5/3                 Manual Intervention             Prone PA      GISTM/STM      Lumbar Manip      SI Manip      Hip belt mobs      Hip LA distraction R 24fnp1s  L 47aqv2i B 51rgid2      Lumbar manual traction 7/8 d/t reported increase in R LE symptoms attempted mechanical traction     Therapeutic Exercise and NMR EXR  [x] (47045) Provided verbal/tactile cueing for activities related to strengthening, flexibility, endurance, ROM  for improvements in proximal hip and core control with self care, mobility, lifting and ambulation.   [x] (77658) Provided verbal/tactile cueing for NMR (03566) x 1  [] VASO  [] Manual (69968) x    [] Other:  [] TA x      [x] Mech Traction (80233)  [] ES(attended) (51633)      [] ES (un) (18704):     Goals:   Patient stated goal: RTW, gym program and heavy adls including yard work without pain. [x]? Progressing: []? Met: []? Not Met: []? Adjusted     Therapist goals for Patient:   Short Term Goals: To be achieved in: 2-4 weeks  1. Independent in HEP and progression per patient tolerance, in order to prevent re-injury. [x]? Progressing: []? Met: []? Not Met: []? Adjusted   2. Patient will have a decrease in pain to facilitate improvement in movement, function, and ADLs as indicated by Functional Deficits. [x]? Progressing: []? Met: []? Not Met: []? Adjusted     Long Term Goals: To be achieved in: 12 weeks  1. Disability index score of 15% or less for the [place] to assist with reaching prior level of function. [x]? Progressing: []? Met: []? Not Met: []? Adjusted  2. Patient will demonstrate increased AROM to WNL, good LS mobility, good hip ROM to allow for proper joint functioning as indicated by patients Functional Deficits.   []? Progressing: []? Met: []? Not Met: []? Adjusted  3. Patient will demonstrate an increase in Strength to good proximal hip and core activation to allow for proper functional mobility as indicated by patients Functional Deficits. [x]? Progressing: []? Met: []? Not Met: []? Adjusted  4. Patient will return to  functional activities  without increased symptoms or restriction. Walking steps sleeping transitional movements   [x]? Progressing: []? Met: []? Not Met: []? Adjusted     Overall Progression Towards Functional goals/ Treatment Progress Update:  [] Patient is progressing as expected towards functional goals listed   [] Progression is slowed due to complexities/Impairments listed. [] Progression has been slowed due to co-morbidities.   [] Plan just implemented, too soon to assess goals progression <30days   [] Goals require adjustment due to lack of progress  [] Patient is not progressing as expected and requires additional follow up with physician  [x] Patient overall presents with decrease in severity of symptoms and increase in function however he continues to have occasional R LE radicular symptoms as well as trunk mobility deficits. Pt overall has responded to treatment and will hopefully continue to improve with further PT. 7/8/21    Prognosis for POC: [x] Good [] Fair  [] Poor      Patient requires continued skilled intervention: [x] Yes  [] No    Treatment/Activity Tolerance:  [x] Patient able to complete treatment  [] Patient limited by fatigue  [] Patient limited by pain     [] Patient limited by other medical complications  [x] Other: 7/8 Pt responded well to mechanical traction with no adverse effects. Fatigued and challenged with core strengthening exercises but no adverse effects noted or reported. Patient education:Reviewed diagnosis, POC, HEP and its importance. 7/8 Educated on importance of consistency with HEP as well as proper posture in standing to decrease pressure placed on back and use his muscles more appropriately. 6/17: Pt asked about swimming laps and was educated on using caution d/t potential increase in symptoms with repeated flexion and extension in certain strokes. Suggested using kick board to keep neutral spine to achieve cardio endurance of exercise. HEP instruction:   ·  Access Code: [email or web address]  Prognosis: [] Good [] Fair  [] Poor    Patient Requires Follow-up: [x] Yes  [] No     PLAN: See eval  [x] Continue per plan of care [] Alter current plan (see comments)  [] Plan of care initiated [] Hold pending MD visit [] Discharge  7/8 Continue with PT 1x a week for 4 more weeks to further increase core stability/ strength and improve lumber mobility allowing for decrease in pain and increase in function.      Francine Daniel, Student Physical Therapist  Therapist was present, directed the patient's care, made skilled judgement, and was responsible for assessment and treatment of the patient. Electronically signed by: Evelin Milian PT      *If patient does not return for further follow ups after this date. Please consider this as the patients discharge from physical therapy.

## 2021-07-08 NOTE — LETTER
[facility] [address] [place] [address]  Phone: [phone]  Fax: [phone]    Carmella Mena MD    July 12, 2021     No primary care provider on file. No primary provider on file. Patient: Mayra Ramos   MR Number: <[id number]>   YOB: 1970   Date of Visit: 7/8/2021       Dear No primary care provider on file.:    Thank you for referring Hussein Godwin to me for evaluation/treatment. Below are the relevant portions of my assessment and plan of care. Impression: . Encounter Diagnoses   Name Primary?     Neck pain Yes    Right lumbar radiculopathy     Abnormal EMG     Paresthesia of right lower extremity     Herniation of intervertebral disc between L5 and S1     Radicular pain in right arm     DDD (degenerative disc disease), lumbar     Right cervical radiculopathy     DDD (degenerative disc disease), cervical     Cervical spondylosis       L3 sensory radiculopathy-right   C7 sensory radiculopathy-right      Plan:           Proceed with MRI evaluation cervical spine evaluate severity of compressive discopathy/stenosis suspected clinically  Activity modification cervical disc protocol  Proceed with L JONATHON #3 right L3/L4 transforaminal  Surgical consultation lumbar spine-Dr. Ramirez July                 Orders:        Orders Placed This Encounter   Procedures    XR CERVICAL SPINE (2-3 VIEWS)     Standing Status:   Future     Number of Occurrences:   1     Standing Expiration Date:   7/8/2022     Order Specific Question:   Reason for exam:     Answer:   neck pain    MRI CERVICAL SPINE WO CONTRAST     [facility], please call pt to schedule, [phone]  Ginny Lomeli will obtain auth and fwd to your facility  Pt advised to f/u in clinic 2-3 days after MRI for results     Standing Status:   Future     Standing Expiration Date:   7/8/2022     Order Specific Question:   Reason for exam:     Answer:   r/o HNP, stenosis   [address] [facility], [facility] MD, Orthopedic Surgery, [facility]     Referral Priority:   Routine     Referral Type:   Eval and Treat     Referral Reason:   Specialty Services Required     Referred to Provider:   Sherren Earls, MD     Requested Specialty:   Orthopedic Surgery     Number of Visits Requested:   1         Aminata Salazar MD.      Lily Chavez: \"This note was dictated with voice recognition software. Though review and correction are routine, we apologize for any errors. \"       If you have questions, please do not hesitate to call me. I look forward to following Trista along with you.     Sincerely,    MD Allison Adair MD

## 2021-07-08 NOTE — PROGRESS NOTES
Chief Complaint:   Chief Complaint   Patient presents with    Lower Back Pain     better after second LESI, still unable to increase activity, still R leg radic    Neck Pain     OPNP, neck and R arm sxs began 1 month ago          History of Present Illness:       Patient is a 53 y.o. male returns follow up for the above complaint. The patient was last seen approximately 6 weeksago. The symptoms are improving since the last visit with respect to his right neuritic thigh pain the patient has had no further testing for the problem. He has noted therapeutic benefit from the most recent L JONATHON however symptoms remain problematic he continues to experience neuritic symptoms involving the anteromedial thigh on the right    Sitting equally problematic as standing. He does not perceive any new onset of progressive weakness of the lower extremities    He denies any new onset bowel or bladder dysfunction    Pain levels 3/10 severity    Back pain to leg pain ratio 70:30 the back pain is aching and stabbing bandlike distribution lumbar spine    His new complaint relates to neck pain and numbness of the right upper extremity    The neck pain is location: right sided and posterior  severity: 6 out of 10 and is worsened by nothing in particular. The patient has not noted new onset or progressive weakness of the upper extremites. The neck pain : arm pain is 50 : 50 and follows aC7 dermatomal pattern. Treatment to date has included none and symptoms have not improved with this treatment. The patient denies history of neck trauma. The patient has no prior history of autoimmune disease, inflammatory arthropathy or crystal arthropathy. Past Medical History:      No past medical history on file. Present Medications:         Current Outpatient Medications   Medication Sig Dispense Refill    gabapentin (NEURONTIN) 300 MG capsule Take 1 capsule by mouth 2 times daily for 60 days.  60 capsule 1    celecoxib (CELEBREX) 200 MG capsule Take 1 capsule by mouth daily 60 capsule 3     No current facility-administered medications for this visit. Allergies:      No Known Allergies        Review of Systems:    Pertinent items are noted in HPI         Vital Signs: There were no vitals filed for this visit. General Exam:     Constitutional: Patient is adequately groomed with no evidence of malnutrition  Mental Status: The patient is oriented to time, place and person. The patient's mood and affect are appropriate. Vascular: Examination reveals no swelling or calf tenderness. Peripheral pulses are palpable and 2+. Lymphatics: no lymphadenopathy of the cervical or axillary regions or upper extremity      Physical Exam: Cervical neck      Primary Exam:    Inspection: No deformity atrophy appreciable curvature      Palpation: No focal trigger point tenderness      Range of Motion: Moderate restriction with extension mild symmetric restriction with rotation and with flexion      Strength: Normal upper extremity      Special Tests: Spurling sign positive-right      Skin: There are no rashes, ulcerations or lesions. Gait: Nonantalgic      Reflex intact upper     Additional Comments:        Additional Examinations:         Neurologic -Light touch sensation and manual muscle testing is normal C5-C8 . Biceps and triceps reflexes are symmetric and +2. Spurlling sign is negative     Lumbar spine examination:    Range of motion 60/10 pain with flexion  Neurologic-hyperesthesia to light touch anterior thigh otherwise normal; manual muscle testing normal L2-S1.   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 Relative straightening of the cervical spine AP projection suggest segmental rightward curvature C4/C5. Office Procedures:     Orders Placed This Encounter   Procedures    XR CERVICAL SPINE (2-3 VIEWS)     Standing Status:   Future     Number of Occurrences:   1     Standing Expiration Date:   7/8/2022     Order Specific Question:   Reason for exam:     Answer:   neck pain    MRI CERVICAL SPINE WO CONTRAST     [facility], please call pt to schedule, [phone] will obtain auth and fwd to your facility  Pt advised to f/u in clinic 2-3 days after MRI for results     Standing Status:   Future     Standing Expiration Date:   7/8/2022     Order Specific Question:   Reason for exam:     Answer:   r/o HNP, stenosis   Carlos Coffey MD, Orthopedic Surgery, [facility]     Referral Priority:   Routine     Referral Type:   Eval and Treat     Referral Reason:   Specialty Services Required     Referred to Provider:   Best Burnette MD     Requested Specialty:   Orthopedic Surgery     Number of Visits Requested:   1    Ambulatory epidural steroid injection     Right-L3/L4 transforaminal     Standing Status:   Future     Standing Expiration Date:   7/12/2022     Scheduling Instructions:      Dr. Gayle Jamil           Other Outside Imaging and Testing Personally Reviewed:    XR CERVICAL SPINE (2-3 VIEWS)    Result Date: 7/8/2021  Radiology exam is complete. No Radiologist dictation. Please follow up with ordering provider.       Epidural steroid injection, Epidurography       History : right lower extremity radiculopathy       COMMENTS :       The low back was prepped and draped in sterile fashion and lidocaine used for local anesthesia.  Under fluoroscopic guidance, a 22 gauge Chiba needle was advanced into the epidural space at the level of the exiting right L3 nerve root and needle position  was documented with contrast injection.  12 mg Celestone , 1cc (2.5 mg) 0.25% Sensorcaine, and 2 cc sterile saline were injected.  The patient tolerated the procedure without complication.             DIAGNOSIS :       Right L3 transforaminal epidural injection of Celestone and Sensorcaine.           Fluoroscopy time : 0.3 minutes   Number of exposures obtained : 1   Blood loss : minimal (less than 5 cc)   Specimens removed : none                 Assessment   Impression: . Encounter Diagnoses   Name Primary?     Herniated lumbar intervertebral disc Yes    Paresthesia of right lower extremity     Right lumbar radiculopathy     Abnormal EMG     Neck pain     Discogenic cervical pain     Radicular pain in right arm     DDD (degenerative disc disease), cervical     Cervical spondylosis       L3 sensory radiculopathy-right   C7 sensory radiculopathy-right        Plan:       Proceed with MRI evaluation cervical spine evaluate severity of compressive discopathy/stenosis suspected clinically  Activity modification cervical disc protocol  Proceed with L JONATHON #3 right L3/L4 transforaminal  Surgical consultation lumbar spine-Dr. Priyanka House         Orders:        Orders Placed This Encounter   Procedures    XR CERVICAL SPINE (2-3 VIEWS)     Standing Status:   Future     Number of Occurrences:   1     Standing Expiration Date:   7/8/2022     Order Specific Question:   Reason for exam:     Answer:   neck pain    MRI CERVICAL SPINE WO CONTRAST     [facility], please call pt to schedule, [phone]  [facility] will obtain auth and fwd to your facility  Pt advised to f/u in clinic 2-3 days after MRI for results     Standing Status:   Future     Standing Expiration Date:   7/8/2022     Order Specific Question:   Reason for exam:     Answer:   r/o HNP, stenosis   Karine Mathis MD, Orthopedic Surgery, [place]     Referral Priority:   Routine     Referral Type:   Eval and Treat     Referral Reason: Specialty Services Required     Referred to Provider:   Brooks Guardado MD     Requested Specialty:   Orthopedic Surgery     Number of Visits Requested:   1    Ambulatory epidural steroid injection     Right-L3/L4 transforaminal     Standing Status:   Future     Standing Expiration Date:   7/12/2022     Scheduling Instructions:      Dr. Carolyn Driscoll MD.      Sarabjit Sood: \"This note was dictated with voice recognition software. Though review and correction are routine, we apologize for any errors. \"

## 2021-07-13 ENCOUNTER — HOSPITAL ENCOUNTER (OUTPATIENT)
Dept: PHYSICAL THERAPY | Age: 51
Setting detail: THERAPIES SERIES
Discharge: HOME | End: 2021-07-13
Payer: COMMERCIAL

## 2021-07-13 PROCEDURE — 97012 MECHANICAL TRACTION THERAPY: CPT

## 2021-07-13 PROCEDURE — 97110 THERAPEUTIC EXERCISES: CPT

## 2021-07-13 PROCEDURE — 97112 NEUROMUSCULAR REEDUCATION: CPT
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
## 2021-07-22 ENCOUNTER — HOSPITAL ENCOUNTER (OUTPATIENT)
Dept: PHYSICAL THERAPY | Age: 51
Setting detail: THERAPIES SERIES
Discharge: HOME | End: 2021-07-22
Payer: COMMERCIAL

## 2021-07-22 PROCEDURE — 97112 NEUROMUSCULAR REEDUCATION: CPT

## 2021-07-22 PROCEDURE — 97110 THERAPEUTIC EXERCISES: CPT

## 2021-07-22 NOTE — FLOWSHEET NOTE
The [address] [place] [address],[address], [address], [address] [place] [address]  Phone: [phone]   Fax:     [phone]    Physical Therapy Daily Treatment Note  Date:  2021    Patient Name:  Rosey Villasenor    :  1970  MRN: [id number]  Restrictions/Precautions:    Medical/Treatment Diagnosis Information:  Diagnosis: M53.3 (ICD-10-CM) - Sacroiliac joint dysfunction  Treatment Diagnosis: LBP with radicular symptoms  Insurance/Certification information:  PT Insurance Information: BCBS no auth  Physician Information:  Referring Practitioner: Leander Bean MD  Has the plan of care been signed (Y/N):        []  Yes  [x]  No     Date of Patient follow up with Physician:5 visits if not 50% better      Is this a Progress Report:     []  Yes  [x]  No        If Yes:  Date Range for reporting period:  Beginning  Ending      Progress report will be due (10 Rx or 30 days whichever is less):       Recertification will be due (POC Duration  / 90 days whichever is less):         Visit # Insurance Allowable Auth Required   11    Spine referral refer to MD in 5 visits if no improvement  60 visits []  Yes [x]  No        Functional Scale:   [place] 34% deficit   Date assessed:   [place] 37% deficit   Date assessed:   [place] 49% deficit   Date assessed:       Latex Allergy:  [x]NO      []YES  Preferred Language for Healthcare:   [x]English       []other:      Pain level:  10     SUBJECTIVE:   Pt reports back is overall doing better with minor symptoms continuing in groin. Pt reports going to the gym and completing stationary bike and stretches with no difficulties.     OBJECTIVE:   Palpation: Tenderness noted at R PSIS   Updated below   ROM   Comments   Trunk flexion 25% restriction     Trunk extension ~20%      Trunk R sidebend Below lateral joint line     Trunk L sidebend Below lateral joint line       Strength Left Right Comments   Hip flexion(L2) 4+ 4+     Knee extension(L3) 5 5     Knee flexion(S1-2) 5 5     Ankle dorsiflexion(L4) 5 5     Toe extension(L5) 5 5     Ankle eversion/plantar flexion(S1) 5 5     Hip abd   4+  4+       Special tests   Comments   Slump test Neg R  Pos L for symptoms R lumbar spine     Facet closure Neg bilat Completed in standing    SLR Neg bilat        RESTRICTIONS/PRECAUTIONS: none    Exercises/Interventions:   ROM/stretches     SKTC     DKTC     Prayer stretch/dav pose     SB roll ins 10\"hx10  6/17   SB LTR 10\"hx10 bilat  6/17   Supine HS 30\"hx3 bilat 7/13 reintroduced d/t increase in LE tigthness   Piriformis stretch 30\"hx3 bilat7/13 foot on opposite bent knee    ITB stretch 30\"hx3 bilat7/13 supine with strap   Hip adductor  30\"hx3 R 7/13 reintroduced d/t LE tightness    Kneeling ant hip stretch Cues to avoid excessive lumbar extension    Cat and camel    Prone lying and prone prop      Prone quad stretch push with opposite leg  5/26 pillow under stomach, 1/2 roll under thigh         Strengthening     Hip abd/add iso 7/13 completed in hook lying d/t symptoms at R PSIS   TA Heel tap 3x10 alt LE 6/8 supine 90/90   LTR 3x10 bilat  6/8 LE 90/90 with BS    TA overhead  7/22 withheld d/t R shoulder and neck pain    Quadruped alternate LE/UE reaches  6/25 withheld d/t time   SL bridges 3x10 bilat 6/8 BS    SLR abd  6/25 progress with side stepping   Side stepping  Blue loop @ knees 3 laps 7/22 reintroduced to focus on hip strengthening   Pallof press Blue TB 3x10 bilat7/22   prone glut sets  With hip ext/UE     tspine open book  start5/3   tspine seated ext with pec stretch  start5/3   Leg press 100# 3x10 bilat 7/22   Wall sits 30\"hx3  7/22 SB at lumbar spine       Manual Intervention             Prone PA      GISTM/STM      Lumbar Manip      SI Manip      Hip belt mobs      Hip LA distraction R [id number]  L [id number] B [id number]      Lumbar manual traction 7/8 d/t reported increase in R LE symptoms attempted mechanical traction     Therapeutic Exercise and NMR EXR  [x] (31074) Provided verbal/tactile cueing for activities related to strengthening, flexibility, endurance, ROM  for improvements in proximal hip and core control with self care, mobility, lifting and ambulation. [x] (97408) Provided verbal/tactile cueing for activities related to improving balance, coordination, kinesthetic sense, posture, motor skill, proprioception  to assist with core control in self care, mobility, lifting, and ambulation. Therapeutic Activities:    [] (07226 or 95470) Provided verbal/tactile cueing for activities related to improving balance, coordination, kinesthetic sense, posture, motor skill, proprioception and motor activation to allow for proper function  with self care and ADLs  [] (25189) Provided training and instruction to the patient for proper core and proximal hip recruitment and positioning with ambulation re-education     Home Exercise Program:    [x] (38183) Reviewed/Progressed HEP activities related to strengthening, flexibility, endurance, ROM of core, proximal hip and LE for functional self-care, mobility, lifting and ambulation   [] (72868) Reviewed/Progressed HEP activities related to improving balance, coordination, kinesthetic sense, posture, motor skill, proprioception of core, proximal hip and LE for self care, mobility, lifting, and ambulation      Manual Treatments:  PROM / STM / Oscillations-Mobs:  G-I, II, III, IV (PA's, Inf., Post.)  [x] (06145) Provided manual therapy to mobilize proximal hip and LS spine soft tissue/joints for the purpose of modulating pain, promoting relaxation,  increasing ROM, reducing/eliminating soft tissue swelling/inflammation/restriction, improving soft tissue extensibility and allowing for proper ROM for normal function with self care, mobility, lifting and ambulation.      Modalities:      Charges:  Timed Code Treatment Minutes: 50   Total Treatment Minutes: 4:55-5:53  62'       [] EVAL (LOW) 72802 (typically 20 minutes face-to-face)  [] EVAL (MOD) 37345 (typically 30 minutes face-to-face)  [] EVAL (HIGH) 11093 (typically 45 minutes face-to-face)  [] RE-EVAL     [x] KF(90825) x  2   [] IONTO  [x] NMR (50963) x 1  [] VASO  [] Manual (08494) x    [] Other:  [] TA x      [] Mech Traction (23389)  [] ES(attended) (14827)      [] ES (un) (88499):     Goals:   Patient stated goal: RTW, gym program and heavy adls including yard work without pain. [x]? Progressing: []? Met: []? Not Met: []? Adjusted     Therapist goals for Patient:   Short Term Goals: To be achieved in: 2-4 weeks  1. Independent in HEP and progression per patient tolerance, in order to prevent re-injury. [x]? Progressing: []? Met: []? Not Met: []? Adjusted   2. Patient will have a decrease in pain to facilitate improvement in movement, function, and ADLs as indicated by Functional Deficits. [x]? Progressing: []? Met: []? Not Met: []? Adjusted     Long Term Goals: To be achieved in: 12 weeks  1. Disability index score of 15% or less for the [place] to assist with reaching prior level of function. [x]? Progressing: []? Met: []? Not Met: []? Adjusted  2. Patient will demonstrate increased AROM to WNL, good LS mobility, good hip ROM to allow for proper joint functioning as indicated by patients Functional Deficits.   []? Progressing: []? Met: []? Not Met: []? Adjusted  3. Patient will demonstrate an increase in Strength to good proximal hip and core activation to allow for proper functional mobility as indicated by patients Functional Deficits. [x]? Progressing: []? Met: []? Not Met: []? Adjusted  4. Patient will return to  functional activities  without increased symptoms or restriction. Walking steps sleeping transitional movements   [x]? Progressing: []? Met: []? Not Met: []?  Adjusted     Overall Progression Towards Functional goals/ Treatment Progress Update:  [] Patient is progressing as expected towards functional goals listed   [] Progression is slowed due to complexities/Impairments listed. [] Progression has been slowed due to co-morbidities. [] Plan just implemented, too soon to assess goals progression <30days   [] Goals require adjustment due to lack of progress  [] Patient is not progressing as expected and requires additional follow up with physician  [x] Patient overall presents with decrease in severity of symptoms and increase in function however he continues to have occasional R LE radicular symptoms as well as trunk mobility deficits. Pt overall has responded to treatment and will hopefully continue to improve with further PT. 7/8/21    Prognosis for POC: [x] Good [] Fair  [] Poor      Patient requires continued skilled intervention: [x] Yes  [] No    Treatment/Activity Tolerance:  [x] Patient able to complete treatment  [] Patient limited by fatigue  [] Patient limited by pain     [] Patient limited by other medical complications  [x] Other: 7/22 Withheld traction d/t no radicular symptoms. Cues required during exercises to avoid excessive lumbar lordosis and properly engage abdominals and gluteals. Fatigued with increase in intensity of treatment but no adverse effects noted or reported. Patient education:Reviewed diagnosis, POC, HEP and its importance. 7/8 Educated on importance of consistency with HEP as well as proper posture in standing to decrease pressure placed on back and use his muscles more appropriately. 6/17: Pt asked about swimming laps and was educated on using caution d/t potential increase in symptoms with repeated flexion and extension in certain strokes. Suggested using kick board to keep neutral spine to achieve cardio endurance of exercise.     HEP instruction:   ·  Access Code: [email or web address]  Prognosis: [] Good [] Fair  [] Poor    Patient Requires Follow-up: [x] Yes  [] No     PLAN: See eval  [x] Continue per plan of care [] Alter current plan (see comments)  [] Plan of care initiated [] Hold pending MD visit [] Discharge  7/22 Pt going on vacation and scheduled to get a third epidural injection when he returns. Pt to f/u when he returns from his trip. 7/8 Continue with PT 1x a week for 4 more weeks to further increase core stability/ strength and improve lumber mobility allowing for decrease in pain and increase in function. Electronically signed by: Oral Tracey, PT      *If patient does not return for further follow ups after this date. Please consider this as the patients discharge from physical therapy.

## 2021-08-06 ENCOUNTER — HOSPITAL ENCOUNTER (OUTPATIENT)
Dept: INTERVENTIONAL RADIOLOGY/VASCULAR | Age: 51
Discharge: HOME | End: 2021-08-06
Payer: COMMERCIAL

## 2021-08-06 DIAGNOSIS — M54.16 LUMBAR RADICULOPATHY: ICD-10-CM

## 2021-08-06 PROCEDURE — 6360000004 HC RX CONTRAST MEDICATION: Performed by: RADIOLOGY

## 2021-08-06 PROCEDURE — 6360000002 HC RX W HCPCS

## 2021-08-06 PROCEDURE — 2500000003 HC RX 250 WO HCPCS

## 2021-08-06 PROCEDURE — 64483 NJX AA&/STRD TFRM EPI L/S 1: CPT

## 2021-08-06 RX ADMIN — IOHEXOL 10 ML: 180 INJECTION INTRAVENOUS at 13:54

## 2021-09-02 ENCOUNTER — OFFICE VISIT (OUTPATIENT)
Dept: ORTHOPEDIC SURGERY | Age: 51
End: 2021-09-02
Payer: COMMERCIAL

## 2021-09-02 VITALS — BODY MASS INDEX: 30.47 KG/M2 | HEIGHT: 73 IN | WEIGHT: 229.94 LBS

## 2021-09-02 DIAGNOSIS — M48.02 FORAMINAL STENOSIS OF CERVICAL REGION: ICD-10-CM

## 2021-09-02 DIAGNOSIS — M51.36 DDD (DEGENERATIVE DISC DISEASE), LUMBAR: ICD-10-CM

## 2021-09-02 DIAGNOSIS — M50.30 DDD (DEGENERATIVE DISC DISEASE), CERVICAL: ICD-10-CM

## 2021-09-02 DIAGNOSIS — M51.26 HERNIATION OF LUMBAR INTERVERTEBRAL DISC: ICD-10-CM

## 2021-09-02 DIAGNOSIS — M54.12 RADICULITIS OF RIGHT CERVICAL REGION: ICD-10-CM

## 2021-09-02 DIAGNOSIS — M54.2 DISCOGENIC CERVICAL PAIN: ICD-10-CM

## 2021-09-02 PROCEDURE — 99214 OFFICE O/P EST MOD 30 MIN: CPT | Performed by: INTERNAL MEDICINE

## 2021-09-02 RX ORDER — GABAPENTIN 300 MG/1
300 CAPSULE ORAL 2 TIMES DAILY
Qty: 60 CAPSULE | Refills: 1 | Status: SHIPPED | OUTPATIENT
Start: 2021-09-02 | End: 2021-12-09 | Stop reason: SDUPTHER

## 2021-09-02 RX ORDER — CELECOXIB 200 MG/1
200 CAPSULE ORAL DAILY
Qty: 60 CAPSULE | Refills: 3 | Status: SHIPPED | OUTPATIENT
Start: 2021-09-02 | End: 2021-12-09 | Stop reason: SDUPTHER

## 2021-09-02 RX ORDER — CYCLOBENZAPRINE HCL 10 MG
10 TABLET ORAL NIGHTLY PRN
Qty: 30 TABLET | Refills: 1 | Status: SHIPPED | OUTPATIENT
Start: 2021-09-02 | End: 2021-12-09 | Stop reason: SDUPTHER

## 2021-09-02 NOTE — LETTER
[facility] [address] [place] [address]  Phone: [phone]  Fax: [phone]    Elijah Garcia MD    September 2, 2021     Agustin Hall MD  [address] [facility] [address]    Patient: Dieudonne Cook   MR Number: <[id number]>   YOB: 1970   Date of Visit: 9/2/2021       Dear Agustin Hall: Thank you for referring Randell Tarango to me for evaluation/treatment. Below are the relevant portions of my assessment and plan of care. Impression: . Encounter Diagnoses   Name Primary?  Discogenic cervical pain     Foraminal stenosis of cervical region     Radiculitis of right cervical region     DDD (degenerative disc disease), cervical     Herniation of lumbar intervertebral disc     DDD (degenerative disc disease), lumbar               Plan:   Formal course of PT and trial of mechanical traction  Continue Celebrex, gabapentin and as needed use of muscle relaxants  Consider lumbar spine intervention injection if symptoms show no appreciable improvement C5/C7 translaminar-right  Continue maintenance HEP with respect to lumbar spine and activity modification lumbar disc protocol          There are no signs of cervical myelopathy on clinical examination. Proceed as outlined above     Orders:        Orders Placed This Encounter   Procedures    Ambulatory referral to Physical Therapy     Referral Priority:   Routine     Referral Type:   Eval and Treat     Referral Reason:   Specialty Services Required     Number of Visits Requested:   1         Dwight Sin MD.      Thi Camarillo: \"This note was dictated with voice recognition software. Though review and correction are routine, we apologize for any errors. \"       If you have questions, please do not hesitate to call me. I look forward to following Trista along with you.     Sincerely,    MD Elijah Doll MD

## 2021-09-02 NOTE — PROGRESS NOTES
Chief Complaint:   Chief Complaint   Patient presents with    Follow-up     F/U Cervical TR MRI    Lower Back Pain     doing well, no radic sxs, PT and last LESI helpful          History of Present Illness:       Patient is a 49 y.o. male returns follow up for the above complaint. The patient was last seen approximately 2 monthsago. The symptoms show no change since the last visit with respect to his neck. The patient has had further testing for the problem. In the interim MRI scan was completed which is outlined below in detail    Neck pain to arm pain ratio 40:60 with neuritic quality of pain involving the arm radiating to all fingers    He denies any new onset of progressive weakness of the upper extremities he denies any new onset gait disturbance    Pain levels 2-3/10    He has discontinued use of gabapentin in the interim as his low back condition has considerably improved    He underwent third lumbar epidural in the interim and his back pain and right thigh pain have considerably improved. Back pain to leg pain ratio now 100: 0    He is apprehensive to transition to higher level physical activities with respect to his low back    Standing better tolerated than sitting and predictable relief with recumbency. Past Medical History:      No past medical history on file. Present Medications:         Current Outpatient Medications   Medication Sig Dispense Refill    gabapentin (NEURONTIN) 300 MG capsule Take 1 capsule by mouth 2 times daily for 60 days. 60 capsule 1    celecoxib (CELEBREX) 200 MG capsule Take 1 capsule by mouth daily 60 capsule 3    cyclobenzaprine (FLEXERIL) 10 MG tablet Take 1 tablet by mouth nightly as needed for Muscle spasms 30 tablet 1     No current facility-administered medications for this visit. Allergies:      No Known Allergies        Review of Systems:    Pertinent items are noted in HPI        Vital Signs:     There were no vitals filed for this visit. General Exam:     Constitutional: Patient is adequately groomed with no evidence of malnutrition    Physical Exam: Cervical neck      Primary Exam:    Inspection: No deformity atrophy appreciable curvature      Palpation: No focal trigger point tenderness      Range of Motion: Mild restriction in extension near full range of flexion, symmetric with rotation reproduction of radicular pain with rotation to the right, neck pain with flexion extension      Strength: Normal upper extremity      Special Tests: Spurling sign deferred, Hernandez sign negative      Skin: There are no rashes, ulcerations or lesions. Gait: Non-ataxic      Neurologic -Light touch sensation and manual muscle testing is normal C5-C8 . Biceps and triceps reflexes are symmetric and +2. Spurlling sign is negative        Additional Comments:        Additional Examinations:            Lumbar spine examination-  Range of motion 60/15 without pain  SLR negative bilaterally  Neurolgic -Light touch sensation and manual muscle testing normal L2-S1. No fasiculations. Pattella tendon and Achilles tendon reflexes +2 bilaterally. Seated SLR negative        Office Imaging Results/Procedures PerformedToday:         Office Procedures:     Orders Placed This Encounter   Procedures    Ambulatory referral to Physical Therapy     Referral Priority:   Routine     Referral Type:   Eval and Treat     Referral Reason:   Specialty Services Required     Number of Visits Requested:   1           Other Outside Imaging and Testing Personally Reviewed:    Site: [facility] #: [id number] #: [id number] Procedure: MR Cervical Spine w/o Contrast ; Reason for Exam: neck pain; radicular pain in right arm. This document is confidential medical information.  Unauthorized disclosure or use of this information is prohibited by law. If you are not the intended recipient of this document, please advise us by calling immediately [phone].       [facility]   [address]           Patient Name: Eriberto Horton   Case ID: [id number]   Patient : 1970   Referring Physician: Antonella Gross MD   Exam Date: 2021   Exam Description: MR Cervical Spine w/o Contrast            HISTORY:  Neck pain; radicular pain in right arm.       TECHNICAL FACTORS:  Long- and short-axis fat- and water-weighted images were performed.       COMPARISON:  MR C-spine performed 2006.       FINDINGS:  No acute fracture is identified. Straightening of the normal cervical lordosis.       Craniocervical junction, atlantodental interval, and cervical spine alignment are anatomic.       Anterior and middle columns are intact as well as the anterior and posterior longitudinal    ligaments.       No focal ligamentous disruption or epidural fluid collection.       Prespinal, paraspinal, and retrospinal soft tissues are normal.       Marrow signal appears normal.       Visualized posterior fossa and position of the cerebellar tonsils appear normal. Visualized    sanchez, medulla, and spinal cord are normal in signal and contour.  No evidence of an intradural    or extradural mass.       C2-C3: Disc desiccation with concentric disc bulging and mild spinal canal stenosis.  Foramina    are patent.       C3-C4: Disc desiccation with concentric disc bulge with mild mass effect on the ventral aspect    of the spinal cord producing moderate spinal canal stenosis.  No evidence of myelomalacia.     Facet arthropathy contributes to moderate right foraminal narrowing.       C4-C5: Posterior fusion of the superior and inferior endplates of C4 on C5.  Hypoplastic disc    may be secondary to congenital changes and/or degenerative in nature.  Facet arthropathy and    mild uncovertebral hypertrophy produce mild to moderate left foraminal stenosis.  No canal    stenosis.       C5-C6: Disc desiccation with concentric disc bulge with central protrusion with moderate mass effect on the ventral aspect of the spinal cord producing moderate spinal canal stenosis.  No    evidence for myelomalacia.  Facet arthropathy and uncovertebral hypertrophy contribute to    produce moderate to severe right foraminal narrowing as well as moderate left foraminal    narrowing.               C6-C7: Disc desiccation with concentric disc bulge with central protrusion with moderate mass    effect on the ventral aspect of the spinal cord producing moderate spinal canal stenosis.  No    evidence for myelomalacia.  Facet arthropathy and uncovertebral hypertrophy contribute to    moderate to severe right foraminal narrowing and severe left foraminal narrowing.       C7-T1: Disc desiccation without bulge or herniation.  Uncovertebral hypertrophy and facet    arthropathy contribute to produce moderate left foraminal narrowing.       Cervicothoracic junction is intact.       Anterior prevertebral soft tissues are normal.       Visualized retropharyngeal spaces are normal.       Visualized vertebral arteries are patent with expected flow-void signal.       Thyroid gland is normal.       Visualized portions of the lung apices are clear.       Visualized esophagus is normal.       CONCLUSION:   1. Moderate to severe right foraminal narrowing at C5-C6 and C6-C7, findings which may    contribute to patient's symptomatology. 2. Moderate to advanced degenerative disc disease of the cervical spine producing varying    degrees of spinal canal stenosis greatest at C3-C4, C5-C6 and C6-C7 where it is moderate. 3. Severe left foraminal narrowing at C6-C7.  Moderate left foraminal narrowing at C5-C6. 4. Findings overall have slightly progressed from prior examination performed July 18, 2006.                                           Thank you for the opportunity to provide your interpretation.               Ric Wyatt.  Ricky Guallpa MD       A: NAHID/ivonne 07/23/2021 6:15 AM   T: TV 07/22/2021 5:15 PM           arrative     Epidural steroid injection, Epidurography       History : right lower extremity radiculopathy, excellent relief with prior injection       COMMENTS :       The low back was prepped and draped in sterile fashion and lidocaine used for local anesthesia.  Under fluoroscopic guidance, a 22 gauge Chiba needle was advanced into the epidural space at the level of the exiting right L3 nerve root and needle position    was documented with contrast injection.  12 mg Celestone , 1cc (2.5 mg) 0.25% Sensorcaine, and 2 cc sterile saline were injected.  The patient tolerated the procedure without complication.             DIAGNOSIS :       Right L3 transforaminal epidural injection of Celestone and Sensorcaine.           Fluoroscopy time : 0.6 minutes   Number of exposures obtained : 1   Blood loss : minimal (less than 5 cc)   Specimens removed : none             Assessment   Impression: . Encounter Diagnoses   Name Primary?  Discogenic cervical pain     Foraminal stenosis of cervical region     Radiculitis of right cervical region     DDD (degenerative disc disease), cervical     Herniation of lumbar intervertebral disc     DDD (degenerative disc disease), lumbar               Plan:   Formal course of PT and trial of mechanical traction  Continue Celebrex, gabapentin and as needed use of muscle relaxants  Consider lumbar spine intervention injection if symptoms show no appreciable improvement C5/C7 translaminar-right  Continue maintenance HEP with respect to lumbar spine and activity modification lumbar disc protocol          There are no signs of cervical myelopathy on clinical examination.  Proceed as outlined above     Orders:        Orders Placed This Encounter   Procedures    Ambulatory referral to Physical Therapy     Referral Priority:   Routine     Referral Type:   Eval and Treat     Referral Reason:   Specialty Services Required     Number of Visits Requested:   71 Lux Schwarz, MD.      Lyubov Palomares: \"This note was dictated with voice recognition software. Though review and correction are routine, we apologize for any errors. \"

## 2021-09-14 ENCOUNTER — HOSPITAL ENCOUNTER (OUTPATIENT)
Dept: PHYSICAL THERAPY | Age: 51
Setting detail: THERAPIES SERIES
Discharge: HOME | End: 2021-09-14
Payer: COMMERCIAL

## 2021-09-14 NOTE — FLOWSHEET NOTE
Physical Therapy  Cancellation/No-show Note  Patient Name:  Ziggy De La Paz  :  1970   Date:  2021  Cancelled visits to date: 0  No-shows to date: 1    For today's appointment patient:  []  Cancelled  []  Rescheduled appointment  [x]  No-show     Reason given by patient:  []  Patient ill  []  Conflicting appointment  []  No transportation    []  Conflict with work  []  No reason given  []  Other:     Comments:      Electronically signed by:  Carol Lovelace PT, PT

## 2021-09-28 ENCOUNTER — HOSPITAL ENCOUNTER (OUTPATIENT)
Dept: INTERVENTIONAL RADIOLOGY/VASCULAR | Age: 51
Discharge: HOME | End: 2021-09-28
Payer: COMMERCIAL

## 2021-09-28 DIAGNOSIS — M54.12 CERVICAL RADICULITIS: ICD-10-CM

## 2021-09-28 PROCEDURE — 62321 NJX INTERLAMINAR CRV/THRC: CPT

## 2021-09-28 PROCEDURE — 6360000002 HC RX W HCPCS

## 2021-09-28 PROCEDURE — 2500000003 HC RX 250 WO HCPCS

## 2021-12-07 ENCOUNTER — TELEPHONE (OUTPATIENT)
Dept: ORTHOPEDIC SURGERY | Age: 51
End: 2021-12-07

## 2021-12-08 ENCOUNTER — TELEPHONE (OUTPATIENT)
Dept: ORTHOPEDIC SURGERY | Age: 51
End: 2021-12-08

## 2021-12-08 NOTE — TELEPHONE ENCOUNTER
Spoke to pt, who wants repeat epidural, told pt he needs to come in for exam since last OV 9/2021. Last [facility] 9/28/21. Pt is scheduled 12/9/21.

## 2021-12-09 ENCOUNTER — OFFICE VISIT (OUTPATIENT)
Dept: ORTHOPEDIC SURGERY | Age: 51
End: 2021-12-09
Payer: COMMERCIAL

## 2021-12-09 VITALS — WEIGHT: 229.94 LBS | BODY MASS INDEX: 30.47 KG/M2 | HEIGHT: 73 IN

## 2021-12-09 DIAGNOSIS — M51.36 DDD (DEGENERATIVE DISC DISEASE), LUMBAR: ICD-10-CM

## 2021-12-09 DIAGNOSIS — M51.26 HERNIATION OF LUMBAR INTERVERTEBRAL DISC: ICD-10-CM

## 2021-12-09 DIAGNOSIS — M47.812 CERVICAL SPONDYLOSIS: ICD-10-CM

## 2021-12-09 DIAGNOSIS — M50.30 DDD (DEGENERATIVE DISC DISEASE), CERVICAL: ICD-10-CM

## 2021-12-09 DIAGNOSIS — M54.2 DISCOGENIC CERVICAL PAIN: ICD-10-CM

## 2021-12-09 PROCEDURE — 99214 OFFICE O/P EST MOD 30 MIN: CPT | Performed by: INTERNAL MEDICINE

## 2021-12-09 RX ORDER — METHYLPREDNISOLONE 4 MG/1
TABLET ORAL
Qty: 1 KIT | Refills: 0 | Status: SHIPPED | OUTPATIENT
Start: 2021-12-09

## 2021-12-09 RX ORDER — CELECOXIB 200 MG/1
200 CAPSULE ORAL DAILY
Qty: 60 CAPSULE | Refills: 3 | Status: SHIPPED | OUTPATIENT
Start: 2021-12-09

## 2021-12-09 RX ORDER — GABAPENTIN 300 MG/1
300 CAPSULE ORAL 2 TIMES DAILY
Qty: 60 CAPSULE | Refills: 1 | Status: SHIPPED | OUTPATIENT
Start: 2021-12-09 | End: 2022-02-07

## 2021-12-09 RX ORDER — CYCLOBENZAPRINE HCL 10 MG
10 TABLET ORAL NIGHTLY PRN
Qty: 30 TABLET | Refills: 1 | Status: SHIPPED | OUTPATIENT
Start: 2021-12-09

## 2021-12-09 NOTE — PROGRESS NOTES
Chief Complaint:   Chief Complaint   Patient presents with    Neck Pain     pain returning, both sides of neck down to [facility]    Lower Back Pain     pain returning, not as bad as neck, but affecting activities          History of Present Illness:       Patient is a 49 y.o. male returns follow up for the above complaint. The patient was last seen approximately 3 months ago and essentially lost to follow-up. The symptoms of neck pain are worsening since the last visit. The patient has had no further testing for the problem. Fortunately he is not experiencing any radicular arm pain at this time. History of right C7 sensory radiculopathy status post C JONATHON 9/28/2021 with therapeutic benefit. The neck pain is location: Posterior and right greater than left upper trapezius  severity: 6 out of 10 and is worsened by flexion, turning right, turning left. The patient has not noted new onset or progressive weakness of the upper extremites. The neck pain : arm pain is 100 : 0    Treatment to date has included C JONATHON as outlined above and symptoms have improved with this treatment. He has not started a formal course of PT in the interim since his last visit. The patient denies history of recent neck trauma. Work-up is included MRI evaluation which is outlined below in detail    The patient has no prior history of autoimmune disease, inflammatory arthropathy or crystal arthropathy. With respect to the lumbar spine he has history of L3 sensory radiculopathy on the right. He has noted resurfacing neuritic quality of pain involving the medial thigh without peripheralization below the knee. Previous work-up is included MRI evaluation of the lumbar spine which is referenced below and EMG evaluation significant for right L3/L4 radiculopathy. He has undergone serial L JONATHON treatment earlier this year most recent intervention right L3/L4 transforaminal injection in August 2021.     The symptoms of RT thigh pain  do not show a neurogenic claudication. There is not new onset weakness or progressive weakness of the lower extremities that has developed. The patient denies new onset bowel or bladder dysfunction. There is no history of recent spinal trauma. Pain levels: 4             Past Medical History:      No past medical history on file. Present Medications:         Current Outpatient Medications   Medication Sig Dispense Refill    gabapentin (NEURONTIN) 300 MG capsule Take 1 capsule by mouth 2 times daily for 60 days. 60 capsule 1    cyclobenzaprine (FLEXERIL) 10 MG tablet Take 1 tablet by mouth nightly as needed for Muscle spasms 30 tablet 1    celecoxib (CELEBREX) 200 MG capsule Take 1 capsule by mouth daily Start after completing Medrol 60 capsule 3    methylPREDNISolone (MEDROL, JAMAL,) 4 MG tablet By mouth. 1 kit 0     No current facility-administered medications for this visit. Allergies:      No Known Allergies        Review of Systems:    Pertinent items are noted in HPI   10 point review of systems negative except as mentioned in HPI     Vital Signs: There were no vitals filed for this visit. General Exam:     Constitutional: Patient is adequately groomed with no evidence of malnutrition  Mental Status: The patient is oriented to time, place and person. The patient's mood and affect are appropriate. Vascular: Examination reveals no swelling or calf tenderness. Peripheral pulses are palpable and 2+.    Lymphatics: no lymphadenopathy of the inguinal region or lower extremity    Physical Exam: Cervical neck      Primary Exam:    Inspection: No deformity atrophy appreciable curvature      Palpation: No focal trigger point tenderness      Range of Motion: Mild to moderate global restriction associated with discomfort in all ranges      Strength: Normal upper extremity      Special Tests: Negative Hernandez's Spurling sign purposely not assessed      Skin: There are no rashes, ulcerations or lesions. Gait: Non-ataxic      Reflex intact upper     Additional Comments:        Additional Examinations:          Neurologic -Light touch sensation and manual muscle testing is normal C5-C8 . Biceps and triceps reflexes are symmetric and +2. Primary Exam:    Inspection: No deformity atrophy appreciable curvature      Palpation: No focal trigger point tenderness      Range of Motion: 80/10 mild discomfort      Strength: Normal lower extremity      Special Tests: Negative SLR      Skin: There are no rashes, ulcerations or lesions. Gait: Nonantalgic      Reflex intact lower     Additional Comments:        Additional Examinations:           Neurolgic -Light touch sensation and manual muscle testing normal L2-S1. No fasiculations. Pattella tendon and Achilles tendon reflexes +2 bilaterally. Seated SLR negative      Office Imaging Results/Procedures PerformedToday:          Office Procedures:     Orders Placed This Encounter   Procedures    MRI LUMBAR SPINE WO CONTRAST     [facility], [place], please call pt to schedule, [address] will obtain auth and fwd to your facility  Pt advised to f/u in clinic 2-3 days after MRI for results     Standing Status:   Future     Standing Expiration Date:   12/9/2022     Order Specific Question:   Reason for exam:     Answer:   r/o progression L3/L4 HNP    Ambulatory referral to Physical Therapy     Referral Priority:   Routine     Referral Type:   Eval and Treat     Referral Reason:   Specialty Services Required     Number of Visits Requested:   1           Other Outside Imaging and Testing Personally Reviewed:    No results found. At L5-S1 there is a left paracentral protrusion deforming the dural sac and left lateral recess.  This measures approximately 4 mm. At L3-4, there is a left foraminal and extraforaminal protrusion that narrows the left neural foramen.     Narrative    HISTORY:  Back pain, cauda equina syndrome suspected  Acute lower back pain. COMPARISON: None   TECHNIQUE:   Multiplanar multisequence MRI images of the lumbar spine   NOTE:  If there are questions about the content of this report, please contact [facility] by calling [phone]     FINDINGS:   ALIGNMENT: No abnormal curvature   BONE MARROW: Unremarkable.  No aggressive osseous lesion or fracture   CONUS:  Unremarkable     DISC LEVELS:   T12-L1:  Unremarkable     L1-2:      Unremarkable     L2-3:      Unremarkable       L3-4:      There is a left foraminal and extraforaminal protrusion with annular fissure that narrows the left neural foramen around the left L3 root.     L4-5:      Minimal disc bulge and facet arthropathy.     L5-S1:    There is a left paracentral protrusion with some hyperintensity on the long TR sequences indenting the dural sac and displacing the left S1 root sleeve posteriorly in the lateral recess.  This is approximately 4 mm in AP dimensions.         LUMBOSACRAL JUNCTION: Unremarkable   SACRUM AND SI JOINTS:  Unremarkable   OTHER:  None    Other Result Text    Interface, Rad Results In - 04/06/2021 12:10 PM EDT   Formatting of this note might be different from the original.   HISTORY:  Back pain, cauda equina syndrome suspected  Acute lower back pain.    COMPARISON: None   TECHNIQUE:   Multiplanar multisequence MRI images of the lumbar spine   NOTE:  If there are questions about the content of this report, please contact [facility] by calling [phone]     FINDINGS:   ALIGNMENT: No abnormal curvature   BONE MARROW: Unremarkable.  No aggressive osseous lesion or fracture   CONUS:  Unremarkable     DISC LEVELS:   T12-L1:  Unremarkable     L1-2:      Unremarkable     L2-3:      Unremarkable       L3-4:      There is a left foraminal and extraforaminal protrusion with annular fissure that narrows the left neural foramen around the left L3 root.     L4-5:      Minimal disc bulge and facet arthropathy.     L5-S1:   Aileen Garcia is a left paracentral protrusion with some hyperintensity on the long TR sequences indenting the dural sac and displacing the left S1 root sleeve posteriorly in the lateral recess.  This is approximately 4 mm in AP dimensions.         LUMBOSACRAL JUNCTION: Unremarkable   SACRUM AND SI JOINTS:  Unremarkable   OTHER:  None     IMPRESSION       At L5-S1 there is a left paracentral protrusion deforming the dural sac and left lateral recess.  This measures approximately 4 mm. At L3-4, there is a left foraminal and extraforaminal protrusion that narrows the left neural foramen. Specimen Collected:    Date: 21   Received From: [address] [place] [address]         Narrative   Site: [facility] #: [id number] #: [id number] Procedure: MR Cervical Spine w/o Contrast ; Reason for Exam: neck pain; radicular pain in right arm. This document is confidential medical information.  Unauthorized disclosure or use of this information is prohibited by law. If you are not the intended recipient of this document, please advise us by calling immediately [phone].       [facility]   AR[address]           Patient Name: Emma Cameron   Case ID: [id number]   Patient : 1970   Referring Physician: Ben Long MD   Exam Date: 2021   Exam Description: MR Cervical Spine w/o Contrast            HISTORY:  Neck pain; radicular pain in right arm.       TECHNICAL FACTORS:  Long- and short-axis fat- and water-weighted images were performed.       COMPARISON:  MR C-spine performed 2006.       FINDINGS:  No acute fracture is identified.  Straightening of the normal cervical lordosis.       Craniocervical junction, atlantodental interval, and cervical spine alignment are anatomic.       Anterior and middle columns are intact as well as the anterior and posterior longitudinal    ligaments.       No focal ligamentous disruption or epidural fluid collection.       Prespinal, paraspinal, and retrospinal soft tissues are normal.       Marrow signal appears normal.       Visualized posterior fossa and position of the cerebellar tonsils appear normal. Visualized    sanchez, medulla, and spinal cord are normal in signal and contour.  No evidence of an intradural    or extradural mass.       C2-C3: Disc desiccation with concentric disc bulging and mild spinal canal stenosis.  Foramina    are patent.       C3-C4: Disc desiccation with concentric disc bulge with mild mass effect on the ventral aspect    of the spinal cord producing moderate spinal canal stenosis.  No evidence of myelomalacia.     Facet arthropathy contributes to moderate right foraminal narrowing.       C4-C5: Posterior fusion of the superior and inferior endplates of C4 on C5.  Hypoplastic disc    may be secondary to congenital changes and/or degenerative in nature.  Facet arthropathy and    mild uncovertebral hypertrophy produce mild to moderate left foraminal stenosis.  No canal    stenosis.       C5-C6: Disc desiccation with concentric disc bulge with central protrusion with moderate mass    effect on the ventral aspect of the spinal cord producing moderate spinal canal stenosis.  No    evidence for myelomalacia.  Facet arthropathy and uncovertebral hypertrophy contribute to    produce moderate to severe right foraminal narrowing as well as moderate left foraminal    narrowing.               C6-C7: Disc desiccation with concentric disc bulge with central protrusion with moderate mass    effect on the ventral aspect of the spinal cord producing moderate spinal canal stenosis.  No    evidence for myelomalacia.  Facet arthropathy and uncovertebral hypertrophy contribute to    moderate to severe right foraminal narrowing and severe left foraminal narrowing.       C7-T1: Disc desiccation without bulge or herniation.  Uncovertebral hypertrophy and facet    arthropathy contribute to produce moderate left foraminal narrowing.       Cervicothoracic junction is intact.       Anterior prevertebral soft tissues are normal.       Visualized retropharyngeal spaces are normal.       Visualized vertebral arteries are patent with expected flow-void signal.       Thyroid gland is normal.       Visualized portions of the lung apices are clear.       Visualized esophagus is normal.       CONCLUSION:   1. Moderate to severe right foraminal narrowing at C5-C6 and C6-C7, findings which may    contribute to patient's symptomatology. 2. Moderate to advanced degenerative disc disease of the cervical spine producing varying    degrees of spinal canal stenosis greatest at C3-C4, C5-C6 and C6-C7 where it is moderate. 3. Severe left foraminal narrowing at C6-C7.  Moderate left foraminal narrowing at C5-C6. 4. Findings overall have slightly progressed from prior examination performed July 18, 2006.                                           Thank you for the opportunity to provide your interpretation.               Angelo Almeida. Deja Sanz MD       A: MP/SP/tv 07/23/2021 6:15 AM   T: TV 07/22/2021 5:15 PM            Epidural steroid injection, Epidurography       History : neck pain       COMMENTS :       The posterior neck was prepped and draped in sterile fashion and lidocaine used for local anesthesia.  Under fluoroscopic guidance, a 22 gauge Tuohy needle was advanced into the epidural space at the C5-6 level from an interlaminar approach and needle    position was documented with contrast injection.  12 mg Celestone and 2 cc sterile saline were injected.  The patient tolerated the procedure without complication.         DIAGNOSIS :       C5-6 translaminar epidural injection of Celestone.           Fluoroscopy time : 0.4 minutes   Number of exposures obtained : 1   Blood loss : minimal (less than 5 cc)   Specimens removed : none         8/6/2021  2:24 PM Andrew, Swo[facility] Incoming Radiology Results From Dari Bruno MD Results       Radiation Dose Estimates    No radiation information found for this patient  Narrative       Epidural steroid injection, Epidurography       History : right lower extremity radiculopathy, excellent relief with prior injection       COMMENTS :       The low back was prepped and draped in sterile fashion and lidocaine used for local anesthesia.  Under fluoroscopic guidance, a 22 gauge Chiba needle was advanced into the epidural space at the level of the exiting right L3 nerve root and needle position    was documented with contrast injection.  12 mg Celestone , 1cc (2.5 mg) 0.25% Sensorcaine, and 2 cc sterile saline were injected.  The patient tolerated the procedure without complication.             DIAGNOSIS :       Right L3 transforaminal epidural injection of Celestone and Sensorcaine.           Fluoroscopy time : 0.6 minutes   Number of exposures obtained : 1   Blood loss : minimal (less than 5 cc)   Specimens removed : none     EMG / NERVE CONDUCTION STUDY        PATIENT:  Yeyo Dawson        DATE OF EM21      YOB: 1970        REASON FOR EMG:   Low back pain and right leg pain        REFERRING PHYSICIAN:  Ivy Loyola MD  [address]  [address] [address]  [place],  [address]      SUMMARY:   The right peroneal and posterior tibial motor nerve studies were normal.  The right sural sensory nerve study was normal.  Needle EMG of several muscles in the right lower extremity showed evidence of mild denervation in the right vastus lateralis muscle and more significant denervation in the right lumbar paraspinal muscles.        CLINICAL DIAGNOSIS:  Lumbar radiculopathy           EMG RESULTS:   This patient has electrophysiological evidence for right 3 L4 radiculopathy.         Assessment   Impression: . Encounter Diagnoses   Name Primary?     Discogenic cervical pain     DDD (degenerative disc disease), cervical     Cervical spondylosis     Herniation of lumbar intervertebral disc     DDD (degenerative disc disease), lumbar       Left-L3/L4 foraminal disc protrusion and L3/L4 right radiculopathy-acute on chronic        Plan:     Cervical spine intervention injection-C JONATHON C5/C6 translaminar  Medical pain management-Medrol Dosepak followed by Celebrex   Activity modification cervical disc protocol and formal course of PT cervical stabilization and trial of mechanical traction  MRI evaluation lumbar spine evaluate for progression of discopathy at L3/L4 and consider repeat lumbar spine intervention injection as needed  Activity modification lumbar disc protocol  Resume gabapentin 300 mg twice daily for right radicular leg pain           Orders:        Orders Placed This Encounter   Procedures    MRI LUMBAR SPINE WO CONTRAST     [facility], [place], please call pt to schedule, [phone]  [facility] will obtain auth and fwd to your facility  Pt advised to f/u in clinic 2-3 days after MRI for results     Standing Status:   Future     Standing Expiration Date:   12/9/2022     Order Specific Question:   Reason for exam:     Answer:   r/o progression L3/L4 HNP    Ambulatory referral to Physical Therapy     Referral Priority:   Routine     Referral Type:   Eval and Treat     Referral Reason:   Specialty Services Required     Number of Visits Requested:   1         Amy Jennings MD.      Tree Logan: \"This note was dictated with voice recognition software. Though review and correction are routine, we apologize for any errors. \"

## 2021-12-10 ENCOUNTER — TELEPHONE (OUTPATIENT)
Dept: ORTHOPEDIC SURGERY | Age: 51
End: 2021-12-10

## 2021-12-10 NOTE — TELEPHONE ENCOUNTER
General Question     Subject: Patient requesting call back to discuss insurance not receiving an order for epidural   Patient[facility] Anish Bocanegra, [address] [facility] [address] Number: [id number]

## 2021-12-15 ENCOUNTER — TELEPHONE (OUTPATIENT)
Dept: ORTHOPEDIC SURGERY | Age: 51
End: 2021-12-15

## 2021-12-15 NOTE — TELEPHONE ENCOUNTER
General Question     Subject: EPIDURAL FOLLOW UP  Patient and /or Facility Request: [facility]  Contact Number: [phone]    PATIENT CALLING REGARDING  FOLLOWING UP ON EPIDURAL. PLEASE CALL BACK PATIENT AT THE ABOVE NUMBER.

## 2021-12-16 NOTE — TELEPHONE ENCOUNTER
General Question     Subject: Patient requesting call back regarding epidural. Patient will like to schedule before the year is out. Please advise.    Patient: Joaquina Rossi

## 2021-12-17 ENCOUNTER — TELEPHONE (OUTPATIENT)
Dept: ORTHOPEDIC SURGERY | Age: 51
End: 2021-12-17

## 2021-12-17 ENCOUNTER — HOSPITAL ENCOUNTER (OUTPATIENT)
Dept: PHYSICAL THERAPY | Age: 51
Setting detail: THERAPIES SERIES
Discharge: HOME | End: 2021-12-17
Payer: COMMERCIAL

## 2021-12-17 PROCEDURE — 97110 THERAPEUTIC EXERCISES: CPT | Performed by: PHYSICAL THERAPIST

## 2021-12-17 PROCEDURE — 97140 MANUAL THERAPY 1/> REGIONS: CPT | Performed by: PHYSICAL THERAPIST

## 2021-12-17 PROCEDURE — 97161 PT EVAL LOW COMPLEX 20 MIN: CPT | Performed by: PHYSICAL THERAPIST

## 2021-12-17 NOTE — TELEPHONE ENCOUNTER
General Question     Subject: PATIENT [facility] REGARDING APPT. MADE FOR EPIDURAL. [facility] SCHEDULES THESE PROCEDURES.         Patient and /or Facility Request: [address] [place] [address] CATH LAB  Contact Number: [phone]

## 2021-12-17 NOTE — TELEPHONE ENCOUNTER
Spoke with pt, order was faxed 12/10/21, but spoke with [facility] as well today and they did not receive at that time. Fax was resent and marked as urgent request for scheduling. I have alerted [facility] to this error, and advised pt.

## 2021-12-17 NOTE — FLOWSHEET NOTE
[address] [facility], [facility] [employer] [address] [place] [address], [address] [place] [address]  Phone: [phone]   Fax:     [phone]                                                     Physical Therapy Daily Treatment Note  Date:  2021    Patient Name:  Wesley Bowman    :  1970  MRN: [id number]  Restrictions/Precautions:    Medical/Treatment Diagnosis Information:  Diagnosis: cervical pain and radiculopathy R UE   MRI CONCLUSION:   1. Moderate to severe right foraminal narrowing at C5-C6 and C6-C7, findings which may    contribute to patient's symptomatology. 2. Moderate to advanced degenerative disc disease of the cervical spine producing varying    degrees of spinal canal stenosis greatest at C3-C4, C5-C6 and C6-C7 where it is moderate. 3. Severe left foraminal narrowing at C6-C7.  Moderate left foraminal narrowing at C5-C6. 4. Findings overall have slightly progressed from prior examination performed 2006.        Treatment Diagnosis: Cervical pain, tingling burning R UE     Insurance/Certification information:  PT Insurance Information: anth  Physician Information:  Referring Practitioner: Hari Aguilar  Has the plan of care been signed (Y/N):        []  Yes  [x]  No     Date of Patient follow up with Physician: *      Is this a Progress Report:     []  Yes  [x]  No        If Yes:  Date Range for reporting period:  Khiyjpahl34/17  Ending    Progress report will be due (10 Rx or 30 days whichever is less):       Recertification will be due (POC Duration  / 90 days whichever is less): 3/17         Visit # Insurance Allowable Auth Required   1  []  Yes []  No        Functional Scale: NDI 32% deficit    Date assessed:       Latex Allergy:  [x]NO      []YES  Preferred Language for Healthcare:   [x]English       []other:      Pain level:  4-5/10     SUBJECTIVE:   See eval    OBJECTIVE: See eval    : RESTRICTIONS/PRECAUTIONS: avoid positions that increase radicular sx    Exercises/Interventions:   Exercise/Equipment Resistance/Repetitions Other comments   Stretching/PROM     CROM     Chin tuck Supine 05m84dim    UT side bend stretch L ear to L shoulder [id number] supine    Levater scap stretch     3 finger cervical rotation  2x10    Pectoral stretch Door 90/90 [id number]    Tspine open book [id number]    Seated tspine ext with pec stretch hands behind head [id number]    Isometrics     Retraction     shrugs     Cervical Flexion      Cervical Extension     Cervical sidebending               PRE's     External Rotation     Internal Rotation     Serratus     Biceps     Triceps     Shrugs     Horizontal Abd with ER     Reverse Flys     EXT     Flexion     Abduction          Cable Column/Theraband     Scapular Retraction     External Rotation     Internal Rotation     Ext     TRIC     Lats     Shrugs     Flex     BIC     PNF                Manual Intervention      Cerv distraction 10min  start12/17   Thoracic mobs/manip      CT manip      Rib mobilizations        Therapeutic Exercise and NMR EXR  [x] (24658) Provided verbal/tactile cueing for activities related to strengthening, flexibility, endurance, ROM  for improvements in cervical, postural, scapular, scapulothoracic and UE control with self care, reaching, carrying, lifting, house/yardwork, driving/computer work.    [] (08892) Provided verbal/tactile cueing for activities related to improving balance, coordination, kinesthetic sense, posture, motor skill, proprioception  to assist with cervical, scapular, scapulothoracic and UE control with self care, reaching, carrying, lifting, house/yardwork, driving/computer work.     Therapeutic Activities:    [] (07699 or 05196) Provided verbal/tactile cueing for activities related to improving balance, coordination, kinesthetic sense, posture, motor skill, proprioception and motor activation to allow for proper function of cervical, scapular, scapulothoracic and UE control with self care, carrying, lifting, driving/computer work. Home Exercise Program:    [x] (38585) Reviewed/Progressed HEP activities related to strengthening, flexibility, endurance, ROM of cervical, scapular, scapulothoracic and UE control with self care, reaching, carrying, lifting, house/yardwork, driving/computer work  [] (59581) Reviewed/Progressed HEP activities related to improving balance, coordination, kinesthetic sense, posture, motor skill, proprioception of cervical, scapular, scapulothoracic and UE control with self care, reaching, carrying, lifting, house/yardwork, driving/computer work      Manual Treatments:  PROM / STM / Oscillations-Mobs:  G-I, II, III, IV (PA's, Inf., Post.)  [x] (68066) Provided manual therapy to mobilize soft tissue/joints of cervical/CT, scapular GHJ and UE for the purpose of decreasing headache, modulating pain, promoting relaxation,  increasing ROM, reducing/eliminating soft tissue swelling/inflammation/restriction, improving soft tissue extensibility and allowing for proper ROM for normal function with self care, reaching, carrying, lifting, house/yardwork, driving/computer work    Modalities:      Charges:  Timed Code Treatment Minutes: 30   Total Treatment Minutes: 75       [x] EVAL (LOW) 21365 (typically 20 minutes face-to-face)  [] EVAL (MOD) 70226 (typically 30 minutes face-to-face)  [] EVAL (HIGH) 17566 (typically 45 minutes face-to-face)  [] RE-EVAL     [x] PN(46181) x  1   [] IONTO  [] NMR (93731) x     [] VASO  [x] Manual (41498) x  1    [] Other:  [] TA x      [] Mech Traction (15397)  [] ES(attended) (86905)      [] ES (un) (12061):     GOALS:  Patient stated goal: no pain, increase ROM, take off shirt without pain     []? Progressing: []? Met: []? Not Met: []? Adjusted     Therapist goals for Patient:   Short Term Goals: To be achieved in: 2 -4 weeks  1.  Independent in HEP and progression per patient tolerance, in order to prevent re-injury. []? Progressing: []? Met: []? Not Met: []? Adjusted   2. Patient will have a decrease in pain to facilitate improvement in movement, function, and ADLs as indicated by Functional Deficits. []? Progressing: []? Met: []? Not Met: []? Adjusted     Long Term Goals: To be achieved in: 12 weeks  1. Disability index score of 10% or less for the NDI to assist with reaching prior level of function. []? Progressing: []? Met: []? Not Met: []? Adjusted  2. Patient will demonstrate increased AROM to max potential  of cervical/thoracic spine to allow for proper joint functioning as indicated by patients Functional Deficits. []? Progressing: []? Met: []? Not Met: []? Adjusted  Patient will demonstrate an increase in postural awareness and control and activation of  Deep cervical stabilizers to allow for proper functional mobility as indicated by patients Functional Deficits. []? Progressing: []? Met: []? Not Met: []? Adjusted  4. Patient will return to  functional activities community activites without increased symptoms or restriction. []? Progressing: []? Met: []? Not Met: []? Adjusted  Overall Progression Towards Functional goals/ Treatment Progress Update:  [] Patient is progressing as expected towards functional goals listed. [] Progression is slowed due to complexities/Impairments listed. [] Progression has been slowed due to co-morbidities.   [x] Plan just implemented, too soon to assess goals progression <30days   [] Goals require adjustment due to lack of progress  [] Patient is not progressing as expected and requires additional follow up with physician  [] Other    Prognosis for POC: [x] Good [] Fair  [] Poor      Patient requires continued skilled intervention: [x] Yes  [] No    Treatment/Activity Tolerance:  [x] Patient able to complete treatment  [] Patient limited by fatigue  [] Patient limited by pain     [] Patient limited by other medical complications  [] Other: Patient Education:   HEP instruction:   Access Code: [email or web address]  URL: [email or web address]  Date: 12/17/2021  Prepared by: Maria Elena Roman     Exercises  Doorway Pec Stretch at 90 Degrees Abduction - 2 x daily - 7 x weekly - 3 reps - 30 hold  Seated Thoracic Lumbar Extension with Pectoralis Stretch - 2 x daily - 7 x weekly - 3 reps - 10 hold  Sidelying Thoracic Rotation with Open Book - 2 x daily - 7 x weekly - 5 reps - 5 hold  Supine Chin Tuck - 2 x daily - 7 x weekly - 10 reps - 10 hold  Supine Cervical Sidebending Stretch - 2 x daily - 7 x weekly - 3 reps - 30 hold  3 Finger Cervical Rotation - 2 x daily - 7 x weekly - 10 reps   PLAN: See eval  [] Continue per plan of care [] Alter current plan (see comments above)  [x] Plan of care initiated [] Hold pending MD visit [] Discharge      Electronically signed by:  Maria Elena Roman, PT    Note: If patient does not return for scheduled/ recommended follow up visits, this note will serve as a discharge from care along with most recent update on progress.

## 2021-12-17 NOTE — PLAN OF CARE
[]other:    SUBJECTIVE: Patient stated complaint:YAMILET    Relevant Medical History:    C-SSRS Triggered by Intake questionnaire (Past 2 wk assessment):   [x] No, Questionnaire did not trigger screening.   [] Yes, Patient intake triggered further evaluation      [] C-SSRS Screening completed  [] PCP notified via Plan of Care  [] Emergency services notified     Functional Disability Index:   NDI 32% deficit  Pain Scale: 4-5/10  Easing factors: change positions  Provocative factors: sitting, sleeping, doffing shirt     Type: [x]Constant   []Intermittent  [x]Radiating RUE BUT B scapular areas base of head []Localized []other:     Numbness/Tingling: R UE    Occupation/School: desk work    Living Status/Prior Level of Function: Independent with ADLs and IADLs,  Gym program     OBJECTIVE:     ROM AROM Comments   Flexion full    Extension 80%    Side bend R 50%    Side bend L 50%    Rotation R 50%    Rotation L 50%    Shoulder AROM clearing              Strength L R Comments   Neck flexion (C1-2)      Neck side bend (C3)      Shoulder elevation (C4)      Shoulder abduction (C5)      Elbow flexion and /or wrist extension  (C6) 5 5    Elbow extension and/or wrist flexion  (C7) 5 5    Thumb extension and/or ulnar deviation ((C8) 5 5    Abduction and /or adduction of hand intrinsics (T1) 5 5          DTR L R Comments   Biceps (C5,C6) 0 0    Brachioradialis (C5,C6) 0 0    Triceps (C7,C8) 0 0              Special Tests Results Comments   Foraminal compression test      Distraction test     Upper limb tension test     Shoulder abduction(relief) test      Vertebral artery test      Valsalva test     Neurologic Signs     Joint play assessments  Decreased C-T spine    other         Joint mobility:    []Normal     [x]Hypo C-T   []Hyper    Palpation: general soft tissue tightness C-T paraspinals, PT sub O    Functional Mobility/Transfers: modified I, some cervical guarding     Posture: slouched FHP    Bandages/Dressings/Incisions: Gait: (include devices/WB status): WNL                        [x] Patient history, allergies, meds reviewed. Medical chart reviewed. See intake form. Review Of Systems (ROS):  [x]Performed Review of systems (Integumentary, CardioPulmonary, Neurological) by intake and observation. Intake form has been scanned into medical record. Patient has been instructed to contact their primary care physician regarding ROS issues if not already being addressed at this time. Co-morbidities/Complexities (which will affect course of rehabilitation):   []None           Arthritic conditions   []Rheumatoid arthritis (M05.9)  [x]Osteoarthritis (M19.91)   Cardiovascular conditions   []Hypertension (I10)  []Hyperlipidemia (E78.5)  []Angina pectoris (I20)  []Atherosclerosis (I70)   Musculoskeletal conditions   [x]Disc pathology   []Congenital spine pathologies   []Prior surgical intervention  []Osteoporosis (M81.8)  []Osteopenia (M85.8)   Endocrine conditions   []Hypothyroid (E03.9)  []Hyperthyroid Gastrointestinal conditions   []Constipation (U25.54)   Metabolic conditions   []Morbid obesity (E66.01)  []Diabetes type 1(E10.65) or 2 (E11.65)   []Neuropathy (G60.9)     Pulmonary conditions   []Asthma (J45)  []Coughing   []COPD (J44.9)   Psychological Disorders  []Anxiety (F41.9)  []Depression (F32.9)   []Other:   []Other:          Barriers to/and or personal factors that will affect rehab potential:              [x]Age  [x]Sex              [x]Motivation/Lack of Motivation                        [x]Co-Morbidities              []Cognitive Function, education/learning barriers              []Environmental, home barriers              [x]profession/work barriers  [x]past PT/medical experience  []other:      Falls Risk Assessment (30 days):   [x] Falls Risk assessed and no intervention required.   [] Falls Risk assessed and Patient requires intervention due to being higher risk   TUG score (>12s at risk):     [] Falls education provided, including       :       ASSESSMENT:    Functional Impairments:     [x]Noted cervical/thoracic/GHJ joint hypomobility   []Noted cervical/thoracic/GHJ joint hypermobility   []Decreased cervical/UE functional ROM   [x]Noted Headache pain aggravated by neck movements with/without dizziness   []Abnormal reflexes/sensation/myotomal/dermatomal deficits   []Decreased DCF control or ability to hold head up   []Decreased RC/scapular/core strength and neuromuscular control    []Decreased UE functional strength   []other:      Functional Activity Limitations (from functional questionnaire and intake)   [x]Reduced ability to tolerate prolonged functional positions   [x]Reduced ability or difficulty with changes of positions or transfers between positions   [x]Reduced ability to maintain good posture and demonstrate good body mechanics with sitting, bending, and lifting   [x] Reduced ability or tolerance with driving and/or computer work   [x]Reduced ability to perform lifting, reaching, carrying tasks   [x]Reduced ability to concentrate   [x]Reduced ability to sleep    [x]Reduced ability to tolerate any impact through UE or spine   [x]Reduced ability to ambulate prolonged functional periods/distances   []other:    Participation Restrictions   []Reduced participation in self care activities   [x]Reduced participation in home management activities   [x]Reduced participation in work activities   [x]Reduced participation in social activities. [x]Reduced participation in sport/recreational activities.     Classification/Subgrouping:   []signs/symptoms consistent with neck pain with mobility deficits     []signs/symptoms consistent with neck pain with movement coordinated impairments    [x]signs/symptoms consistent with neck pain with radiating pain    []signs/symptoms consistent with neck pain with headaches (cervicogenic)    []Signs/symptoms consistent with nerve root involvement including myotome & dermatome dysfunction   []sign/symptoms consistent with facet dysfunction of cervical and thoracic spine    []signs/symptoms consistent suggesting central cord compression/UMN syndromes   []signs/symptoms consistent with discogenic cervical pain   []signs/symptoms consistent with rib dysfunction   [x]signs/symptoms consistent with postural dysfunction   []signs/symptoms consistent with shoulder pathology    []signs/symptoms consistent with post-surgical status including decreased ROM, strength and function.    []signs/symptoms consistent with pathology which may benefit from Dry Needling   []signs/symptoms which may limit the use of advanced manual therapy techniques: (Hypertension, recent trauma, intolerance to end range positions, prior TIA, visual issues, UE myotomes loss )     Prognosis/Rehab Potential:      []Excellent   [x]Good    []Fair   []Poor    Tolerance of evaluation/treatment:    []Excellent   [x]Good    []Fair   []Poor      Physical Therapy Evaluation Complexity Justification  [x] A history of present problem with:  [] no personal factors and/or comorbidities that impact the plan of care;  [x]1-2 personal factors and/or comorbidities that impact the plan of care  []3 personal factors and/or comorbidities that impact the plan of care  [x] An examination of body systems using standardized tests and measures addressing any of the following: body structures and functions (impairments), activity limitations, and/or participation restrictions;:  [] a total of 1-2 or more elements   [x] a total of 3 or more elements   [] a total of 4 or more elements   [x] A clinical presentation with:  [x] stable and/or uncomplicated characteristics   [] evolving clinical presentation with changing characteristics  [] unstable and unpredictable characteristics;   [x] Clinical decision making of [x] low, [] moderate, [] high complexity using standardized patient assessment instrument and/or measurable assessment of functional outcome. [x] EVAL (LOW) 89302 (typically 20 minutes face-to-face)  [] EVAL (MOD) 77067 (typically 30 minutes face-to-face)  [] EVAL (HIGH) 91312 (typically 45 minutes face-to-face)  [] RE-EVAL     PLAN:   Frequency/Duration:  1-2 days per week for 12 Weeks:  Interventions:  [x]  Therapeutic exercise including: strength training, ROM, for cervical spine,scapula, core and Upper extremity, including postural re-education. [x]  NMR activation and proprioception for Deep cervical flexors, periscapular and RC muscles and Core, including postural re-education. [x]  Manual therapy as indicated for C/T spine, ribs, Soft tissue to include: Dry Needling/IASTM, STM, PROM, Gr I-IV mobilizations, manipulation. [x] Modalities as needed that may include: thermal agents, E-stim, Biofeedback, US, iontophoresis as indicated  [x] Patient education on joint protection, postural re-education, activity modification, progression of HEP. HEP instruction:   Access Code: [email or web address]  URL: [email or web address]  Date: 12/17/2021  Prepared by: Jg Stephenson    Exercises  Doorway Pec Stretch at 90 Degrees Abduction - 2 x daily - 7 x weekly - 3 reps - 30 hold  Seated Thoracic Lumbar Extension with Pectoralis Stretch - 2 x daily - 7 x weekly - 3 reps - 10 hold  Sidelying Thoracic Rotation with Open Book - 2 x daily - 7 x weekly - 5 reps - 5 hold  Supine Chin Tuck - 2 x daily - 7 x weekly - 10 reps - 10 hold  Supine Cervical Sidebending Stretch - 2 x daily - 7 x weekly - 3 reps - 30 hold  3 Finger Cervical Rotation - 2 x daily - 7 x weekly - 10 reps    GOALS:   Patient stated goal: no pain, increase ROM, take off shirt without pain     [] Progressing: [] Met: [] Not Met: [] Adjusted    Therapist goals for Patient:   Short Term Goals: To be achieved in: 2 -4 weeks  1. Independent in HEP and progression per patient tolerance, in order to prevent re-injury. [] Progressing: [] Met: [] Not Met: [] Adjusted   2.  Patient will have a decrease in pain to facilitate improvement in movement, function, and ADLs as indicated by Functional Deficits. [] Progressing: [] Met: [] Not Met: [] Adjusted    Long Term Goals: To be achieved in: 12 weeks  1. Disability index score of 10% or less for the NDI to assist with reaching prior level of function. [] Progressing: [] Met: [] Not Met: [] Adjusted  2. Patient will demonstrate increased AROM to max potential  of cervical/thoracic spine to allow for proper joint functioning as indicated by patients Functional Deficits. [] Progressing: [] Met: [] Not Met: [] Adjusted  Patient will demonstrate an increase in postural awareness and control and activation of  Deep cervical stabilizers to allow for proper functional mobility as indicated by patients Functional Deficits. [] Progressing: [] Met: [] Not Met: [] Adjusted  4. Patient will return to  functional activities community activites without increased symptoms or restriction. [] Progressing: [] Met: [] Not Met: [] Adjusted       Electronically signed by:  Kellie Martini PT    Note: If patient does not return for scheduled/ recommended follow up visits, this note will serve as a discharge from care along with most recent update on progress.

## 2021-12-28 ENCOUNTER — HOSPITAL ENCOUNTER (OUTPATIENT)
Dept: INTERVENTIONAL RADIOLOGY/VASCULAR | Age: 51
Discharge: HOME | End: 2021-12-28
Payer: COMMERCIAL

## 2021-12-28 DIAGNOSIS — M50.30 DDD (DEGENERATIVE DISC DISEASE), CERVICAL: ICD-10-CM

## 2021-12-28 PROCEDURE — 62321 NJX INTERLAMINAR CRV/THRC: CPT

## 2021-12-28 PROCEDURE — 2500000003 HC RX 250 WO HCPCS

## 2021-12-28 PROCEDURE — 6360000004 HC RX CONTRAST MEDICATION: Performed by: RADIOLOGY

## 2021-12-28 PROCEDURE — 6360000002 HC RX W HCPCS

## 2021-12-28 RX ADMIN — IOHEXOL 10 ML: 180 INJECTION INTRAVENOUS at 14:46

## 2021-12-29 ENCOUNTER — HOSPITAL ENCOUNTER (OUTPATIENT)
Dept: PHYSICAL THERAPY | Age: 51
Setting detail: THERAPIES SERIES
Discharge: HOME | End: 2021-12-29
Payer: COMMERCIAL

## 2021-12-29 PROCEDURE — 97110 THERAPEUTIC EXERCISES: CPT

## 2021-12-29 PROCEDURE — 97140 MANUAL THERAPY 1/> REGIONS: CPT

## 2021-12-29 PROCEDURE — 97112 NEUROMUSCULAR REEDUCATION: CPT

## 2021-12-29 NOTE — FLOWSHEET NOTE
[address] [facility], [facility] [address] [place] [address], [address] [place] [address]  Phone: [phone]   Fax:     [phone]                                                     Physical Therapy Daily Treatment Note  Date:  2021    Patient Name:  Aurora Tate    :  1970  MRN: [id number]  Restrictions/Precautions:    Medical/Treatment Diagnosis Information:  Diagnosis: cervical pain and radiculopathy R UE   MRI CONCLUSION:   1. Moderate to severe right foraminal narrowing at C5-C6 and C6-C7, findings which may    contribute to patient's symptomatology. 2. Moderate to advanced degenerative disc disease of the cervical spine producing varying    degrees of spinal canal stenosis greatest at C3-C4, C5-C6 and C6-C7 where it is moderate. 3. Severe left foraminal narrowing at C6-C7.  Moderate left foraminal narrowing at C5-C6. 4. Findings overall have slightly progressed from prior examination performed 2006.        Treatment Diagnosis: Cervical pain, tingling burning R UE     Insurance/Certification information:  PT Insurance Information: bill  Physician Information:  Referring Practitioner: Jaja Melendez  Has the plan of care been signed (Y/N):        [x]  Yes  []  No     Date of Patient follow up with Physician:       Is this a Progress Report:     []  Yes  [x]  No        If Yes:  Date Range for reporting period:  [id number]/17  Ending    Progress report will be due (10 Rx or 30 days whichever is less):       Recertification will be due (POC Duration  / 90 days whichever is less): 3/17         Visit # Insurance Allowable Auth Required   2  []  Yes []  No        Functional Scale: NDI 32% deficit    Date assessed:       Latex Allergy:  [x]NO      []YES  Preferred Language for Healthcare:   [x]English       []other:      Pain level:  2/10   12/29      SUBJECTIVE:   Pt received second epidural injection in cervical spine yesterday which has helped alleviate symptoms but still has tingling down R UE.  Has discomfort R side of neck when he rties to lift arms over head     OBJECTIVE: See eval 12/17   :      RESTRICTIONS/PRECAUTIONS: avoid positions that increase radicular sx    Exercises/Interventions:   Exercise/Equipment Resistance/Repetitions Other comments   Stretching/PROM     CROM     Chin tuck Supine [id number] 12/29 cues to avoid cervical flexion     UT side bend stretch L ear to L shoulder [id number] supine    Levater scap stretch     3 finger cervical rotation  3x10 ^12/29    Pectoral stretch Door 90/90 [id number]    Tspine open book [id number]    supine tspine ext with pec stretch hands behind head [id number] 12/29 1/2 foam roll behind spine    Isometrics     Retraction 10\"hx10 bilat 12/29 completed at wall with towel roll    shrugs     Cervical Flexion      Cervical Extension     Cervical sidebending               PRE's     External Rotation     Internal Rotation     Serratus     Biceps     Triceps     Shrugs     Horizontal Abd with ER     Reverse Flys     EXT     Flexion     Abduction          Cable Column/Theraband     Scapular Retraction Blue TB 3x10 12/29 seated on SB    External Rotation     Internal Rotation     Ext     TRIC     Lats     Shrugs     Flex     BIC     PNF                Manual Intervention      Cerv distraction 10\" on, 10\" rest x6'  12/29   Cerv mobs/manip Grade 2-3 x4' R C4-C7 12/29   CT manip      Rib mobilizations        Therapeutic Exercise and NMR EXR  [x] (99614) Provided verbal/tactile cueing for activities related to strengthening, flexibility, endurance, ROM  for improvements in cervical, postural, scapular, scapulothoracic and UE control with self care, reaching, carrying, lifting, house/yardwork, driving/computer work.    [] (11810) Provided verbal/tactile cueing for activities related to improving balance, coordination, kinesthetic sense, posture, motor skill, proprioception  to assist with cervical, scapular, scapulothoracic and UE control with self care, reaching, carrying, lifting, house/yardwork, driving/computer work. Therapeutic Activities:    [] (91321 or 65086) Provided verbal/tactile cueing for activities related to improving balance, coordination, kinesthetic sense, posture, motor skill, proprioception and motor activation to allow for proper function of cervical, scapular, scapulothoracic and UE control with self care, carrying, lifting, driving/computer work.      Home Exercise Program:    [x] (56770) Reviewed/Progressed HEP activities related to strengthening, flexibility, endurance, ROM of cervical, scapular, scapulothoracic and UE control with self care, reaching, carrying, lifting, house/yardwork, driving/computer work  [] (26234) Reviewed/Progressed HEP activities related to improving balance, coordination, kinesthetic sense, posture, motor skill, proprioception of cervical, scapular, scapulothoracic and UE control with self care, reaching, carrying, lifting, house/yardwork, driving/computer work      Manual Treatments:  PROM / STM / Oscillations-Mobs:  G-I, II, III, IV (PA's, Inf., Post.)  [x] (39574) Provided manual therapy to mobilize soft tissue/joints of cervical/CT, scapular GHJ and UE for the purpose of decreasing headache, modulating pain, promoting relaxation,  increasing ROM, reducing/eliminating soft tissue swelling/inflammation/restriction, improving soft tissue extensibility and allowing for proper ROM for normal function with self care, reaching, carrying, lifting, house/yardwork, driving/computer work    Modalities:      Charges:  Timed Code Treatment Minutes: 40'   Total Treatment Minutes: 4:45-5:28  37'       [] EVAL (LOW) 93204 (typically 20 minutes face-to-face)  [] EVAL (MOD) 04430 (typically 30 minutes face-to-face)  [] EVAL (HIGH) 24663 (typically 45 minutes face-to-face)  [] RE-EVAL     [x] KG(64146) x  1   [] IONTO  [x] NMR (82361) x  1   [] VASO  [x] Manual (14377) x 1    [] Other:  [] TA x      [] [facility] Traction (78816)  [] ES(attended) (91916)      [] ES (un) (17167):     GOALS:  Patient stated goal: no pain, increase ROM, take off shirt without pain     []? Progressing: []? Met: []? Not Met: []? Adjusted     Therapist goals for Patient:   Short Term Goals: To be achieved in: 2 -4 weeks  1. Independent in HEP and progression per patient tolerance, in order to prevent re-injury. []? Progressing: []? Met: []? Not Met: []? Adjusted   2. Patient will have a decrease in pain to facilitate improvement in movement, function, and ADLs as indicated by Functional Deficits. []? Progressing: []? Met: []? Not Met: []? Adjusted     Long Term Goals: To be achieved in: 12 weeks  1. Disability index score of 10% or less for the NDI to assist with reaching prior level of function. []? Progressing: []? Met: []? Not Met: []? Adjusted  2. Patient will demonstrate increased AROM to max potential  of cervical/thoracic spine to allow for proper joint functioning as indicated by patients Functional Deficits. []? Progressing: []? Met: []? Not Met: []? Adjusted  Patient will demonstrate an increase in postural awareness and control and activation of  Deep cervical stabilizers to allow for proper functional mobility as indicated by patients Functional Deficits. []? Progressing: []? Met: []? Not Met: []? Adjusted  4. Patient will return to  functional activities community activites without increased symptoms or restriction. []? Progressing: []? Met: []? Not Met: []? Adjusted  Overall Progression Towards Functional goals/ Treatment Progress Update:  [] Patient is progressing as expected towards functional goals listed. [] Progression is slowed due to complexities/Impairments listed. [] Progression has been slowed due to co-morbidities.   [x] Plan just implemented, too soon to assess goals progression <30days   [] Goals require adjustment due to lack of progress  [] Patient is not progressing as expected and requires additional follow up with physician  [] Other    Prognosis for POC: [x] Good [] Fair  [] Poor      Patient requires continued skilled intervention: [x] Yes  [] No    Treatment/Activity Tolerance:  [x] Patient able to complete treatment  [] Patient limited by fatigue  [] Patient limited by pain     [] Patient limited by other medical complications  [] Other: 12/29 Continued with manual therapy but did not progress to mechanical traction d/t receiving injection yesterday. Tolerated light manual therapy well today with no adverse effects. Responded well to postural and scapular activation exercises with minimal cues                  Patient Education:   HEP instruction:   Access Code: [email or web address]    PLAN: See eval  [x] Continue per plan of care [] Alter current plan (see comments above)  [x] Plan of care initiated [] Hold pending MD visit [] Discharge      Electronically signed by:  Elisa Tobias PT    Note: If patient does not return for scheduled/ recommended follow up visits, this note will serve as a discharge from care along with most recent update on progress.

## 2022-01-07 ENCOUNTER — HOSPITAL ENCOUNTER (OUTPATIENT)
Dept: PHYSICAL THERAPY | Age: 52
Setting detail: THERAPIES SERIES
Discharge: HOME | End: 2022-01-07
Payer: COMMERCIAL

## 2022-01-07 PROCEDURE — 97140 MANUAL THERAPY 1/> REGIONS: CPT

## 2022-01-07 PROCEDURE — 97110 THERAPEUTIC EXERCISES: CPT

## 2022-01-07 PROCEDURE — 97112 NEUROMUSCULAR REEDUCATION: CPT

## 2022-01-07 NOTE — FLOWSHEET NOTE
[facility], [facility] [address] [place] [address], [address] [place] [address]  Phone: [phone]   Fax:     [phone]                                                     Physical Therapy Daily Treatment Note  Date:  2022    Patient Name:  Tyrell Latif    :  1970  MRN: [id number]  Restrictions/Precautions:    Medical/Treatment Diagnosis Information:  Diagnosis: cervical pain and radiculopathy R UE   MRI CONCLUSION:   1. Moderate to severe right foraminal narrowing at C5-C6 and C6-C7, findings which may    contribute to patient's symptomatology. 2. Moderate to advanced degenerative disc disease of the cervical spine producing varying    degrees of spinal canal stenosis greatest at C3-C4, C5-C6 and C6-C7 where it is moderate. 3. Severe left foraminal narrowing at C6-C7.  Moderate left foraminal narrowing at C5-C6. 4. Findings overall have slightly progressed from prior examination performed 2006.        Treatment Diagnosis: Cervical pain, tingling burning R UE     Insurance/Certification information:  PT Insurance Information: bill  Physician Information:  Referring Practitioner: Damaris Whelan  Has the plan of care been signed (Y/N):        [x]  Yes  []  No     Date of Patient follow up with Physician:       Is this a Progress Report:     []  Yes  [x]  No        If Yes:  Date Range for reporting period:  [id number]/17  Ending    Progress report will be due (10 Rx or 30 days whichever is less):       Recertification will be due (POC Duration  / 90 days whichever is less): 3/17         Visit # Insurance Allowable Auth Required   3    []  Yes []  No        Functional Scale: NDI 32% deficit    Date assessed:       Latex Allergy:  [x]NO      []YES  Preferred Language for Healthcare:   [x]English       []other:      Pain level:  3/10   1/7      SUBJECTIVE:   Pt states he has had to wear mask at work causing increase tension at neck. Completing HEP with no increase in symptoms.  Still having some symptoms down R UE.     OBJECTIVE: See eval 12/17   :      RESTRICTIONS/PRECAUTIONS: avoid positions that increase radicular sx    Exercises/Interventions:   Exercise/Equipment Resistance/Repetitions Other comments   Stretching/PROM     CROM     Chin tuck Supine [id number] 12/29 cues to avoid cervical flexion     UT side bend stretch L ear to L shoulder [id number]  1/7 seated   Levater scap stretch 30 sec x3 R 1/7 seated    3 finger cervical rotation  3x10 ^12/29    Pectoral stretch Door 90/90 [id number]    Tspine open book [id number]    supine tspine ext with pec stretch hands behind head [id number] 12/29 1/2 foam roll behind spine    Isometrics     Retraction Reviewed posture for in sitting 1/7   shrugs     Cervical Flexion      Cervical Extension     Cervical sidebending               PRE's     External Rotation     Internal Rotation     Serratus     Biceps     Triceps     Shrugs     Horizontal Abd with ER     Reverse Flys     EXT     Flexion     Abduction          Cable Column/Theraband     Scapular Retraction Blue TB 3x10 12/29 seated on SB    External Rotation     Internal Rotation     Ext Blue TB 3x10 1//7 seated on SB   Scap squeeze with ER Green TB 3x10 1/7    Lats     Shrugs     Flex     BIC     PNF                Manual Intervention      Cerv distraction 10\" on, 10\" rest x6'  12/29   Cerv mobs/manip  R C4-C7 12/29   STM x4' bilat sub occipitals 1/7           Therapeutic Exercise and NMR EXR  [x] (03841) Provided verbal/tactile cueing for activities related to strengthening, flexibility, endurance, ROM  for improvements in cervical, postural, scapular, scapulothoracic and UE control with self care, reaching, carrying, lifting, house/yardwork, driving/computer work.    [] (88810) Provided verbal/tactile cueing for activities related to improving balance, coordination, kinesthetic sense, posture, motor skill, proprioception  to assist with cervical, scapular, scapulothoracic and UE control with self care, reaching, carrying, lifting, house/yardwork, driving/computer work. Therapeutic Activities:    [] (85856 or 89034) Provided verbal/tactile cueing for activities related to improving balance, coordination, kinesthetic sense, posture, motor skill, proprioception and motor activation to allow for proper function of cervical, scapular, scapulothoracic and UE control with self care, carrying, lifting, driving/computer work.      Home Exercise Program:    [x] (46805) Reviewed/Progressed HEP activities related to strengthening, flexibility, endurance, ROM of cervical, scapular, scapulothoracic and UE control with self care, reaching, carrying, lifting, house/yardwork, driving/computer work  [] (43311) Reviewed/Progressed HEP activities related to improving balance, coordination, kinesthetic sense, posture, motor skill, proprioception of cervical, scapular, scapulothoracic and UE control with self care, reaching, carrying, lifting, house/yardwork, driving/computer work      Manual Treatments:  PROM / STM / Oscillations-Mobs:  G-I, II, III, IV (PA's, Inf., Post.)  [x] (24967) Provided manual therapy to mobilize soft tissue/joints of cervical/CT, scapular GHJ and UE for the purpose of decreasing headache, modulating pain, promoting relaxation,  increasing ROM, reducing/eliminating soft tissue swelling/inflammation/restriction, improving soft tissue extensibility and allowing for proper ROM for normal function with self care, reaching, carrying, lifting, house/yardwork, driving/computer work    Modalities:      Charges:  Timed Code Treatment Minutes: 40'   Total Treatment Minutes: 4:50-5:30  40'       [] EVAL (LOW) 34416 (typically 20 minutes face-to-face)  [] EVAL (MOD) 95062 (typically 30 minutes face-to-face)  [] EVAL (HIGH) 41438 (typically 45 minutes face-to-face)  [] RE-EVAL     [x] KW(48039) x  1   [] IONTO  [x] NMR (41129) x  1   [] VASO  [x] Manual ([id number]) x  1    [] Other:  [] TA x      [] Mech Traction (74108)  [] ES(attended) (11641)      [] ES (un) (02759):     GOALS:  Patient stated goal: no pain, increase ROM, take off shirt without pain     []? Progressing: []? Met: []? Not Met: []? Adjusted     Therapist goals for Patient:   Short Term Goals: To be achieved in: 2 -4 weeks  1. Independent in HEP and progression per patient tolerance, in order to prevent re-injury. []? Progressing: []? Met: []? Not Met: []? Adjusted   2. Patient will have a decrease in pain to facilitate improvement in movement, function, and ADLs as indicated by Functional Deficits. []? Progressing: []? Met: []? Not Met: []? Adjusted     Long Term Goals: To be achieved in: 12 weeks  1. Disability index score of 10% or less for the NDI to assist with reaching prior level of function. []? Progressing: []? Met: []? Not Met: []? Adjusted  2. Patient will demonstrate increased AROM to max potential  of cervical/thoracic spine to allow for proper joint functioning as indicated by patients Functional Deficits. []? Progressing: []? Met: []? Not Met: []? Adjusted  Patient will demonstrate an increase in postural awareness and control and activation of  Deep cervical stabilizers to allow for proper functional mobility as indicated by patients Functional Deficits. []? Progressing: []? Met: []? Not Met: []? Adjusted  4. Patient will return to  functional activities community activites without increased symptoms or restriction. []? Progressing: []? Met: []? Not Met: []? Adjusted  Overall Progression Towards Functional goals/ Treatment Progress Update:  [] Patient is progressing as expected towards functional goals listed. [] Progression is slowed due to complexities/Impairments listed. [] Progression has been slowed due to co-morbidities.   [x] Plan just implemented, too soon to assess goals progression <30days   [] Goals require adjustment due to lack of progress  [] Patient is not progressing as expected and requires additional follow up with physician  [] Other    Prognosis for POC: [x] Good [] Fair  [] Poor      Patient requires continued skilled intervention: [x] Yes  [] No    Treatment/Activity Tolerance:  [x] Patient able to complete treatment  [] Patient limited by fatigue  [] Patient limited by pain     [] Patient limited by other medical complications  [] Other: 1/7: Pt presented with excessive forward head and rounded shoulder posture contributing to symptoms at sub occipitals. Responded well to manual therapy with no adverse effects and mild relief in symptoms. Cues and education provided with scapular exercises to improve posture. Patient Education:   HEP instruction:   Access Code: [email or web address]    PLAN: See eval  [x] Continue per plan of care [] Alter current plan (see comments above)  [x] Plan of care initiated [] Hold pending MD visit [] Discharge      Electronically signed by:  Kate Morocho PT    Note: If patient does not return for scheduled/ recommended follow up visits, this note will serve as a discharge from care along with most recent update on progress.